# Patient Record
Sex: MALE | Race: WHITE | NOT HISPANIC OR LATINO | Employment: UNEMPLOYED
[De-identification: names, ages, dates, MRNs, and addresses within clinical notes are randomized per-mention and may not be internally consistent; named-entity substitution may affect disease eponyms.]

---

## 2023-03-15 ENCOUNTER — TRANSCRIBE ORDERS (OUTPATIENT)
Dept: OTHER | Age: 18
End: 2023-03-15

## 2023-03-15 ENCOUNTER — TELEPHONE (OUTPATIENT)
Dept: WOUND CARE | Facility: CLINIC | Age: 18
End: 2023-03-15
Payer: COMMERCIAL

## 2023-03-15 DIAGNOSIS — L89.154 PRESSURE INJURY OF COCCYGEAL REGION, STAGE 4 (H): Primary | ICD-10-CM

## 2023-03-15 NOTE — TELEPHONE ENCOUNTER
Relative of the patient called to follow up on a referral sent in for the patient who has a stage 4 pressure injury of the coccygeal region. Please call [facility] [phone]

## 2023-03-16 NOTE — TELEPHONE ENCOUNTER
Please schedule with Rama CARRILLO at [facility] for next available appointment.    **If scheduling with Rama CARRILLO please schedule a follow up 2-3 weeks after initial appointment.    Is the patient able to make their own medical decisions? Yes    Is patient a LILIBETH lift? PLEASE INQUIRE WHEN MAKING THE APPOINTMENT AND PUT IN APPOINTMENT NOTES    Routing to  Wound Healing Scheduling.

## 2023-04-27 ENCOUNTER — MEDICAL CORRESPONDENCE (OUTPATIENT)
Dept: HEALTH INFORMATION MANAGEMENT | Facility: CLINIC | Age: 18
End: 2023-04-27
Payer: COMMERCIAL

## 2023-04-28 ENCOUNTER — HOSPITAL ENCOUNTER (OUTPATIENT)
Dept: WOUND CARE | Facility: CLINIC | Age: 18
Discharge: HOME | End: 2023-04-28
Attending: PHYSICIAN ASSISTANT | Admitting: PHYSICIAN ASSISTANT
Payer: COMMERCIAL

## 2023-04-28 VITALS
RESPIRATION RATE: 20 BRPM | SYSTOLIC BLOOD PRESSURE: 129 MMHG | HEART RATE: 113 BPM | TEMPERATURE: 98.4 F | DIASTOLIC BLOOD PRESSURE: 60 MMHG

## 2023-04-28 DIAGNOSIS — L89.214 PRESSURE INJURY OF TROCHANTERIC REGION OF RIGHT HIP, STAGE 4 (H): ICD-10-CM

## 2023-04-28 DIAGNOSIS — L89.154 PRESSURE INJURY OF COCCYGEAL REGION, STAGE 4 (H): Primary | ICD-10-CM

## 2023-04-28 LAB
CRP SERPL-MCNC: 47.48 MG/L
ERYTHROCYTE [SEDIMENTATION RATE] IN BLOOD BY WESTERGREN METHOD: 31 MM/HR (ref 0–15)

## 2023-04-28 PROCEDURE — 85652 RBC SED RATE AUTOMATED: CPT | Performed by: PHYSICIAN ASSISTANT

## 2023-04-28 PROCEDURE — 17250 CHEM CAUT OF GRANLTJ TISSUE: CPT | Performed by: PHYSICIAN ASSISTANT

## 2023-04-28 PROCEDURE — 99205 OFFICE O/P NEW HI 60 MIN: CPT | Mod: 25 | Performed by: PHYSICIAN ASSISTANT

## 2023-04-28 PROCEDURE — 36415 COLL VENOUS BLD VENIPUNCTURE: CPT | Performed by: PHYSICIAN ASSISTANT

## 2023-04-28 PROCEDURE — 84134 ASSAY OF PREALBUMIN: CPT | Performed by: PHYSICIAN ASSISTANT

## 2023-04-28 PROCEDURE — G0463 HOSPITAL OUTPT CLINIC VISIT: HCPCS | Mod: 25

## 2023-04-28 PROCEDURE — 86140 C-REACTIVE PROTEIN: CPT | Performed by: PHYSICIAN ASSISTANT

## 2023-04-28 NOTE — PROGRESS NOTES
Patient Active Problem List   Diagnosis     Pressure injury of coccygeal region, stage 4 (H)     Pressure injury of trochanteric region of right hip, stage 4 (H)     No past medical history on file.  Labs: No results for input(s): ALBUMIN, HGB, INR, WBC, A1C, CRP in the last 34844 hours.    Invalid input(s): PREALBUMIN,  GLUCOSE, MICROBIO  Nutrition requirements were discussed with patient today.  Vitals:  /60 (BP Location: Right arm, Patient Position: Sitting, Cuff Size: Adult Large)   Pulse 113   Temp 98.4  F (36.9  C) (Temporal)   Resp 20   Wound:   Wound (used by OP [facility] only) 04/28/23 1106 coccyx pressure injury (Active)   Thickness/Stage Stage 4 04/28/23 1100   Base pink;slough 04/28/23 1100   Periwound macerated 04/28/23 1100   Periwound Temperature warm 04/28/23 1100   Periwound Skin Turgor soft 04/28/23 1100   Edges open 04/28/23 1100   Length (cm) 4.7 04/28/23 1100   Width (cm) 2.2 04/28/23 1100   Depth (cm) 2.3 04/28/23 1100   Wound (cm^2) 10.34 cm^2 04/28/23 1100   Wound Volume (cm^3) 23.78 cm^3 04/28/23 1100   Undermining [Depth (cm)/Location] 12-12o'/ 5.7cm 04/28/23 1100   Drainage Characteristics/Odor yellow;tan;creamy;malodorous 04/28/23 1100   Drainage Amount copious 04/28/23 1100   Care, Wound chemical cautery applied 04/28/23 1100       Wound (used by OP [facility] only) 04/28/23 1108 Right greater trochanter pressure injury (Active)   Thickness/Stage Stage 4 04/28/23 1100   Base hypergranulation;pink;moist 04/28/23 1100   Periwound intact;macerated 04/28/23 1100   Periwound Temperature warm 04/28/23 1100   Periwound Skin Turgor soft 04/28/23 1100   Edges open 04/28/23 1100   Length (cm) 5.2 04/28/23 1100   Width (cm) 5.7 04/28/23 1100   Depth (cm) 1 04/28/23 1100   Wound (cm^2) 29.64 cm^2 04/28/23 1100   Wound Volume (cm^3) 29.64 cm^3 04/28/23 1100   Drainage Characteristics/Odor yellow;tan;serous;malodorous 04/28/23 1100   Drainage Amount copious 04/28/23 1100   Care, Wound chemical cautery  applied 04/28/23 1100      Photo:    Further instructions from your care team       Jamaal HOLM Favio      2005  A DME order was not completed because the supplies are ordered by home care or at a care facility  [facility] [phone] Fax [phone]   Please order supplies    MRI of Pelvis and right Trochanter- order sent to [facility]  Blood work done today: Pre- Albumen; CRP, ESR  Silver Nitrate applied to both wounds and recommend to do every 2 weeks    A diet high in protein is important for wound healing, we recommend getting 90 grams of protein per day. Taking protein shakes or bars are a good way to get extra protein in your diet.   Good sources of protein:  Pork 26g per 3 oz  Whey protein powder - 24g per scoop (on average)  Greek yogurt - 23g per 8oz   Chicken or Turkey - 23g per 3oz  Fish - 20-25g per 3oz  Beef - 18-23g per 3oz  Navy beans - 20g per cup  Cottage cheese - 14g per 1/2 cup   Lentils - 13g per 1/4 cup  Beef jerky 13g per 1oz  2% milk - 8g per cup  Peanut butter - 8g per 2 tablespoons  Eggs - 6g per egg  Mixed nuts - 6g per 2oz    Repositioning: Order placed today for Group 2 with [employer]    Bed: Patient will need Group 2, low air loss mattress due to 2 large, stage 4 ulceration on the patient's pelvis that has failed to improve on a normal mattress despite regular wound cares and repositioning. A group 1 mattress will not be adequate to offload these severe ulcerations. Patient has impaired sensation and is unable to reposition independently.    Reposition MINIMALLY every 1-2 hours in bed to relieve pressure and promote perfusion to tissue.    Purple mattress is OK once wounds are healed    Chair:     [facility] will send New Wheel chair and cushion; new cushion is Pressure mapped    When up to the chair, do not sit for longer than one hour total before returning to bed for at least 60 minutes to relieve pressure and promote perfusion to the tissue.  Completely  "recline/tilt for 15 minutes each hour.  o Sit on a chair cushion when up to the chair.     Wound Dressing Change: Coccyx  - Wash your hands with soap and water before you begin your dressing change and prepare a clean surface for dressings.  After cleansing with mild unscented soap (such as Cetaphil, Cerave or Dove) and water, Apply small amount of VASHE or Vinegar solution on gauze, lay into wound bed, let sit for 5-10 minutes, remove gauze (do not rinse) then apply dressing:  Ribbon and tuck Antimicrobial 4\" roll gauze into the undermine area and fill dead space; do not over pack  Cover with Absorbant 6x6 Pad/ Kwik/ Kerra Max/ Xudry  Secure with 2\" roll Medipore  Change 1-2 times a day and as needed for excessive soilage    Wound Dressing Change: Right Trochanter  - Wash your hands with soap and water before you begin your dressing change and prepare a clean surface for dressings.  After cleansing with mild unscented soap (such as Cetaphil, Cerave or Dove) and water, Apply small amount of VASHE or Vinegar solution on gauze, lay into wound bed, let sit for 5-10 minutes, remove gauze (do not rinse) then apply dressing:  Ribbon and tuck Antimicrobial 4\" roll gauze into the undermine area and fill dead space; do not over pack  Cover with Absorbant 6x6 Pad/ Kwik/ Kerra Max/ Xudry  Secure with 2\" roll Medipore  Change 1-2 times a day and as needed for excessive soilage    To have flap surgery with Dr. Fernandez you must have  1. Group 2 Mattress - Maintain bedrest 4 weeks post op    2. [employer] Medical: expecting new wheelchair with proper mapping  3. Proper nutrition with high Protein diet  4. Bowel and bladder control: self cath and bowel program  5. Non smoker- NO NICOTINE     Brenda Paulino PA-C April 28, 2023         "

## 2023-04-28 NOTE — DISCHARGE INSTRUCTIONS
Jamaal Stahl      2005    A DME order was not completed because the supplies are ordered by home care or at a care facility    [facility] [phone] Fax [phone]   Please order supplies    MRI of Pelvis and right Trochanter- order sent to [facility]  Blood work done today: Pre- Albumen; CRP, ESR  Silver Nitrate applied to both wounds and recommend to do every 2 weeks    A diet high in protein is important for wound healing, we recommend getting 90 grams of protein per day. Taking protein shakes or bars are a good way to get extra protein in your diet.   Good sources of protein:  Pork 26g per 3 oz  Whey protein powder - 24g per scoop (on average)  Greek yogurt - 23g per 8oz   Chicken or Turkey - 23g per 3oz  Fish - 20-25g per 3oz  Beef - 18-23g per 3oz  Navy beans - 20g per cup  Cottage cheese - 14g per 1/2 cup   Lentils - 13g per 1/4 cup  Beef jerky 13g per 1oz  2% milk - 8g per cup  Peanut butter - 8g per 2 tablespoons  Eggs - 6g per egg  Mixed nuts - 6g per 2oz    Repositioning: Order placed today for Group 2 with [employer]  Bed: Patient will need Group 2, low air loss mattress due to 2 large, stage 4 ulceration on the patient's pelvis that has failed to improve on a normal mattress despite regular wound cares and repositioning. A group 1 mattress will not be adequate to offload these severe ulcerations. Patient has impaired sensation and is unable to reposition independently.  Reposition MINIMALLY every 1-2 hours in bed to relieve pressure and promote perfusion to tissue.  Purple mattress is OK once wounds are healed  Chair:   [facility] will send New Wheel chair and cushion; new cushion is Pressure mapped  When up to the chair, do not sit for longer than one hour total before returning to bed for at least 60 minutes to relieve pressure and promote perfusion to the tissue.  Completely recline/tilt for 15 minutes each hour.  Sit on a chair cushion when up to the chair.     Wound  "Dressing Change: Coccyx  - Wash your hands with soap and water before you begin your dressing change and prepare a clean surface for dressings.  After cleansing with mild unscented soap (such as Cetaphil, Cerave or Dove) and water, Apply small amount of VASHE or Vinegar solution on gauze, lay into wound bed, let sit for 5-10 minutes, remove gauze (do not rinse) then apply dressing:  Ribbon and tuck Antimicrobial 4\" roll gauze into the undermine area and fill dead space; do not over pack  Cover with Absorbant 6x6 Pad/ Kwik/ Kerra Max/ Xudry  Secure with 2\" roll Medipore  Change 1-2 times a day and as needed for excessive soilage    Wound Dressing Change: Right Trochanter  - Wash your hands with soap and water before you begin your dressing change and prepare a clean surface for dressings.  After cleansing with mild unscented soap (such as Cetaphil, Cerave or Dove) and water, Apply small amount of VASHE or Vinegar solution on gauze, lay into wound bed, let sit for 5-10 minutes, remove gauze (do not rinse) then apply dressing:  Ribbon and tuck Antimicrobial 4\" roll gauze into the undermine area and fill dead space; do not over pack  Cover with Absorbant 6x6 bordered dressing Kerramax/Sorbion/Zetuvit  Change 1-2 times a day and as needed for excessive soilage    To have flap surgery with Dr. Fernandez you must have  1. Group 2 Mattress - Maintain bedrest 4 weeks post op    2. [employer]i Medical: expecting new wheelchair with proper mapping  3. Proper nutrition with high Protein diet  4. Bowel and bladder control: self cath and bowel program  5. Non smoker- NO NICOTINE    How to prepare the vinegar solution:    1. Mix 2 tablespoons of white household vinegar with 2 cups of water  2. Store in the refrigerator and use for up to 5 days     Brenda Paulino PA-C April 28, 2023    Call us at [phone] if you have any questions about your wounds, have redness or swelling around your wound, have a fever of 101 or greater or if " you have any other problems or concerns. We answer the phone Monday through Friday 8 am to 4 pm, please leave a message as we check the voicemail frequently throughout the day. If you had a positive experience please indicate that on your patient satisfaction survey form that [facility] will be sending you.It was a pleasure meeting with you today.  Thank you for allowing me and my team the privilege of caring for you today.  YOU are the reason we are here, and I truly hope we provided you with the excellent service you deserve.  Please let us know if there is anything else we can do for you so that we can be sure you are leaving completely satisfied with your care experience.    If you have any billing related questions please call the [facility] Business office at [phone]. The clinic staff does not handle billing related matters. If you are scheduled to have a follow up appointment, you will receive a reminder call the day before your visit. On the appointment day please arrive 15 minutes prior to your appointment time. If you are unable to keep that appointment, please call the clinic to cancel or reschedule. If you are more than 10 minutes late or greater for your appointment, the clinic policy is that you may be asked to reschedule.
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
## 2023-05-01 LAB — PREALB SERPL IA-MCNC: 13 MG/DL (ref 15–45)

## 2023-05-12 ENCOUNTER — TELEPHONE (OUTPATIENT)
Dept: WOUND CARE | Facility: CLINIC | Age: 18
End: 2023-05-12
Payer: COMMERCIAL

## 2023-05-12 NOTE — TELEPHONE ENCOUNTER
MRI received from [facility] - negative for osteomyelitis in sacrum or R hip. Ok to give this information to patient if he calls for results, otherwise will pass along to Dr. Fernandez to discuss at his next visit.

## 2023-05-15 ENCOUNTER — MEDICAL CORRESPONDENCE (OUTPATIENT)
Dept: HEALTH INFORMATION MANAGEMENT | Facility: CLINIC | Age: 18
End: 2023-05-15
Payer: COMMERCIAL

## 2023-05-15 ENCOUNTER — TELEPHONE (OUTPATIENT)
Dept: WOUND CARE | Facility: CLINIC | Age: 18
End: 2023-05-15
Payer: COMMERCIAL

## 2023-05-15 NOTE — TELEPHONE ENCOUNTER
Magi from [facility] called, patients needs OK to use a wrap with a foam edge to protect the dressing when he transfers.  [phone]

## 2023-05-15 NOTE — TELEPHONE ENCOUNTER
Returned call to Magi and gave ok for bordered foam dressings. No further questions or concerns.

## 2023-05-15 NOTE — TELEPHONE ENCOUNTER
Returned call to Magi. Discussed with her that the face to face will be faxed to [employer]. Magi states [employer] is stating the orders were never received. Will fax the order, face to face, and facesheet again to [employer]. Magi also given the vinegar solution ratio:    How to prepare the vinegar solution:    1. Mix 2 tablespoons of white household vinegar with 2 cups of water  2. Store in the refrigerator and use for up to 5 days    No further questions or concerns.

## 2023-05-15 NOTE — TELEPHONE ENCOUNTER
[facility] to follow up on order for mattress from [facility].  They are asking for a face to face.  Did [facility] send this?  Is this something his PCP should be providing?  Also, insurance is not covering Vashe.  What is the vinegar/water ratio for that solution? [phone]

## 2023-05-17 NOTE — ADDENDUM NOTE
Encounter addended by: Eneida Edwards RN on: 5/17/2023 8:26 AM   Actions taken: Edited Discharge Instructions

## 2023-05-31 ENCOUNTER — MEDICAL CORRESPONDENCE (OUTPATIENT)
Dept: HEALTH INFORMATION MANAGEMENT | Facility: CLINIC | Age: 18
End: 2023-05-31
Payer: COMMERCIAL

## 2023-06-13 ENCOUNTER — MEDICAL CORRESPONDENCE (OUTPATIENT)
Dept: HEALTH INFORMATION MANAGEMENT | Facility: CLINIC | Age: 18
End: 2023-06-13
Payer: COMMERCIAL

## 2023-07-05 ENCOUNTER — HOSPITAL ENCOUNTER (OUTPATIENT)
Dept: WOUND CARE | Facility: CLINIC | Age: 18
Discharge: HOME | End: 2023-07-05
Attending: PHYSICIAN ASSISTANT | Admitting: PHYSICIAN ASSISTANT
Payer: COMMERCIAL

## 2023-07-05 VITALS — SYSTOLIC BLOOD PRESSURE: 108 MMHG | TEMPERATURE: 97.9 F | HEART RATE: 69 BPM | DIASTOLIC BLOOD PRESSURE: 66 MMHG

## 2023-07-05 DIAGNOSIS — L89.154 PRESSURE INJURY OF COCCYGEAL REGION, STAGE 4 (H): ICD-10-CM

## 2023-07-05 DIAGNOSIS — L89.214 PRESSURE INJURY OF TROCHANTERIC REGION OF RIGHT HIP, STAGE 4 (H): Primary | ICD-10-CM

## 2023-07-05 PROCEDURE — 17250 CHEM CAUT OF GRANLTJ TISSUE: CPT | Performed by: PHYSICIAN ASSISTANT

## 2023-07-05 PROCEDURE — 99214 OFFICE O/P EST MOD 30 MIN: CPT | Mod: 25 | Performed by: PHYSICIAN ASSISTANT

## 2023-07-05 RX ORDER — BACLOFEN 10 MG/1
10 TABLET ORAL
COMMUNITY
Start: 2022-10-07

## 2023-07-05 RX ORDER — OXYBUTYNIN CHLORIDE 5 MG/1
15 TABLET ORAL
COMMUNITY
Start: 2022-04-10 | End: 2023-09-20

## 2023-07-05 NOTE — DISCHARGE INSTRUCTIONS
"Jamaal A Ripplemead      2005    A DME order was not completed because the supplies are ordered by home care or at a care facility      [facility] [phone] Fax [phone] Please order supplies (they come every day)    Silver Nitrate applied to both wounds and recommend to do every week  We recommend to have have a wound vac on your coccyx as there was no infection shown on your MRI. Have your Local wound clinic order or put in their input for next dressings based on appearance and drainage. Follow up with us in 3 months as not recommending surgery at this time.     Received Group 2 mattress.     Wound Dressing Change: Coccyx  - Wash your hands with soap and water before you begin your dressing change and  prepare a clean surface for dressings.  After cleansing with mild unscented soap (such as Cetaphil, Cerave or Dove) and  water, Apply small amount of VASHE or Vinegar solution on gauze, lay into wound  bed, let sit for 5-10 minutes, remove gauze (do not rinse) then apply dressing:  Ribbon and tuck Antimicrobial 4\" roll gauze into the undermine area and fill dead  space; do not over pack  Cover with Absorbant 6x6 Pad/ Kwik/ Kerra Max/ Xudry  Secure with 2\" roll Medipore  Change 1-2 times a day and as needed for excessive soilage    Wound Dressing Change: Right Trochanter  - Wash your hands with soap and water before you begin your dressing change and  prepare a clean surface for dressings.  After cleansing with mild unscented soap (such as Cetaphil, Cerave or Dove) and  water, Apply small amount of VASHE or Vinegar solution on gauze, lay into wound  bed, let sit for 5-10 minutes, remove gauze (do not rinse) then apply dressing:  Ribbon and tuck Antimicrobial 4\" roll gauze into the undermine area and fill dead  space; do not over pack  Cover with Absorbant 6x6 bordered dressing Kerramax/Sorbion/Zetuvit  Change 1-2 times a day and as needed for excessive soilage    How to prepare the vinegar " solution:  1. Mix 2 tablespoons of white household vinegar with 2 cups of water  2. Store in the refrigerator and use for up to 5 days    A diet high in protein is important for wound healing, we recommend getting 90  grams of protein per day. Taking protein shakes or bars are a good way to get extra  protein in your diet.    Good sources of protein:  Pork 26g per 3 oz  Whey protein powder - 24g per scoop (on average)  Greek yogurt - 23g per 8oz  Chicken or Turkey - 23g per 3oz  Fish - 20-25g per 3oz  Beef - 18-23g per 3oz  Navy beans - 20g per cup  Cottage cheese - 14g per 1/2 cup  Lentils - 13g per 1/4 cup  Beef jerky 13g per 1oz  2% milk - 8g per cup  Peanut butter - 8g per 2 tablespoons  Eggs - 6g per egg  Mixed nuts - 6g per 2oz    Repositioning: Order placed today for Group 2 with Reliable (received)  Bed: Patient will need Group 2, low air loss mattress due to 2 large, stage 4  ulceration on the patient's pelvis that has failed to improve on a normal mattress  despite regular wound cares and repositioning. A group 1 mattress will not be  adequate to offload these severe ulcerations. Patient has impaired sensation and is  unable to reposition independently.  Reposition MINIMALLY every 1-2 hours in bed to relieve pressure and promote  perfusion to tissue.  Purple mattress is OK once wounds are healed  Chair:  [facility] will send New Wheel chair and cushion; new cushion is Pressure  mapped  When up to the chair, do not sit for longer than one hour total before returning to  bed for at least 60 minutes to relieve pressure and promote perfusion to the tissue.  Completely recline/tilt for 15 minutes each hour.  Sit on a chair cushion when up to the chair.     Brenda Paulino PA-C July 5, 2023    Call us at [phone] if you have any questions about your wounds, have redness or swelling around your wound, have a fever of 101 degrees Fahrenheit or greater or if you have any other problems or concerns. We  answer the phone Monday through Friday 8 am to 4 pm, please leave a message as we check the voicemail frequently throughout the day.     If you had a positive experience please indicate that on your patient satisfaction survey form that [facility] will be sending you.    It was a pleasure meeting with you today.  Thank you for allowing me and my team the privilege of caring for you today.  YOU are the reason we are here, and I truly hope we provided you with the excellent service you deserve.  Please let us know if there is anything else we can do for you so that we can be sure you are leaving completely satisfied with your care experience.      If you have any billing related questions please call the [facility] Business office at [phone]. The clinic staff does not handle billing related matters.    If you are scheduled to have a follow up appointment, you will receive a reminder call the day before your visit. On the appointment day please arrive 15 minutes prior to your appointment time. If you are unable to keep that appointment, please call the clinic to cancel or reschedule. If you are more than 10 minutes late or greater for your scheduled appointment time, the clinic policy is that you may be asked to reschedule.
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
## 2024-01-23 ENCOUNTER — HOSPITAL ENCOUNTER (OUTPATIENT)
Dept: WOUND CARE | Facility: CLINIC | Age: 19
Discharge: HOME | End: 2024-01-23
Attending: PHYSICIAN ASSISTANT | Admitting: PHYSICIAN ASSISTANT
Payer: COMMERCIAL

## 2024-01-23 VITALS — HEART RATE: 104 BPM | TEMPERATURE: 96.9 F | DIASTOLIC BLOOD PRESSURE: 86 MMHG | SYSTOLIC BLOOD PRESSURE: 134 MMHG

## 2024-01-23 DIAGNOSIS — L89.154 PRESSURE INJURY OF COCCYGEAL REGION, STAGE 4 (H): ICD-10-CM

## 2024-01-23 DIAGNOSIS — L89.214 PRESSURE INJURY OF TROCHANTERIC REGION OF RIGHT HIP, STAGE 4 (H): Primary | ICD-10-CM

## 2024-01-23 LAB
CRP SERPL-MCNC: 108.13 MG/L
ERYTHROCYTE [SEDIMENTATION RATE] IN BLOOD BY WESTERGREN METHOD: 55 MM/HR (ref 0–15)
PREALB SERPL-MCNC: 7.4 MG/DL (ref 20–40)

## 2024-01-23 PROCEDURE — 17250 CHEM CAUT OF GRANLTJ TISSUE: CPT | Performed by: PHYSICIAN ASSISTANT

## 2024-01-23 PROCEDURE — 84134 ASSAY OF PREALBUMIN: CPT | Performed by: PHYSICIAN ASSISTANT

## 2024-01-23 PROCEDURE — 85652 RBC SED RATE AUTOMATED: CPT | Performed by: PHYSICIAN ASSISTANT

## 2024-01-23 PROCEDURE — 86140 C-REACTIVE PROTEIN: CPT | Performed by: PHYSICIAN ASSISTANT

## 2024-01-23 PROCEDURE — 99215 OFFICE O/P EST HI 40 MIN: CPT | Mod: 25 | Performed by: PHYSICIAN ASSISTANT

## 2024-01-23 PROCEDURE — 36415 COLL VENOUS BLD VENIPUNCTURE: CPT | Performed by: PHYSICIAN ASSISTANT

## 2024-01-23 NOTE — DISCHARGE INSTRUCTIONS
Jamaal Stahl      2005    A DME order for supplies has been placed to [facility]. If there are any issues with your order including not receiving the order please call [facility] at [phone]. They can also provide a tracking number for you.      Dressing changes outside of clinic are being performed by [facility] Care [facility] Family    [facility] [phone] Fax [phone] coming daily     PLAN: 01/23/24  -check labs today (CPR, sed rate, pre albumin)  -increase protein intake  -Right hip daily dressing as below; coccyx twice daily as below  -[facility] for supplies  -Rama Paulino PA-C will discuss with Dr Fernandez; you will see Dr Fernandez next in a month and another for one month beyond that; surgery timeline and readiness will be discussed in upcoming visits    Preparing for Flap Surgery  To be eligible for flap surgery:  DENIED No nicotine use 2 months prior to surgery.  DONE Have Group 2 mattress in their home PRIOR to scheduling surgery.  Ideally this is initiated at first contact with patient if not already in home.  Must have bed in their permanent residence even if they are planning on rehabbing in a facility.  DONE Have a seating system that has been pressure mapped and assessed within the last year. If determined to need a new chair this needs to be available PRIOR to scheduling surgery.   Check today 01/23/24 Prealbumin >15 mg/dL. Patient should be counseled ASAP on high protein diet, ideally should utilize a protein supplement.  Ongoing Demonstrate compliance with offloading. Wounds should demonstrate stability or improvement while preparing for surgery.   Str cath and daily bowel program Have an established, effective, bowel and bladder program.  If patient does not have a colostomy they must have a bowel program that can be performed in bed, discussed , in a lateral position, with no concern that incision/wound will become contaminated. If patient does not already have a catheter they can  "expect a amos catheter at time of surgery that will be left in for 3-4 weeks.   N/A If diabetic, this must be well-controlled.   Work-up prior to consult with Dr. Fernandez:  Above requirements should be met or in progress.  Unlikely to need addl MRI Pelvic MRI w & w/o contrast performed within the [facility] system within the last year.  If unable to undergo MRI can defer next imaging choice to Dr. Fernandez.  Check today 01/24/24 Recent CRP, ESR, and prealbumin values  Expectations for rehab post-flap surgery:  100% bed rest with HOB <30 degrees for at least 4 weeks in a setting that has 24 hour care (TCU, SNF etc). If any issues with wound dehiscence this may be prolonged. It has been increasingly more difficult to find placement in facilities for patients post-flap, if patient has sufficient services in their current living situation rehab at home may be considered. Flap surgery does not guarantee facility placement.   Attend a 4 week post-op appointment at the [facility]. Patient must arrive via stretcher transport to be arranged by patient or facility.  This may cost $250-$800 out of pocket.   Patient may not sit up (in bed or otherwise) until cleared at the post-op appointment. At that point patient will be given a detailed seating protocol which will instruct patient how to gradually begin sitting up in bed. Next, patient's seating system will need to be pressure mapped before starting the wheelchair portion of the sitting protocol.     Continue to be nicotine free at least 6 weeks after surgery.        Wound Dressing Change: right trochanter  - Wash your hands with soap and water before you begin your dressing change and prepare a clean surface for dressings.  - Cleanse with mild unscented soap (such as Cetaphil, Cerave or Dove) and water  - Apply small amount of VASHE on gauze, lay into wound bed, let sit for 10 minutes, remove gauze (do not rinse)   - apply 1/6 of 4x5\" Hydrofera " "Blue Ready Transfer foam as wound filler, cut to fit size of wound  - cover with Zetuvit Plus silicone border super absorbant 5x5\"  Change daily and if needed for excess saturation    Wound Dressing Change: coccyx  - Wash your hands with soap and water before you begin your dressing change and prepare a clean surface for dressings.  - Cleanse with mild unscented soap (such as Cetaphil, Cerave or Dove) and water  - Apply small amount of VASHE on gauze, lay into wound bed, let sit for 10 minutes, remove gauze (do not rinse)  -  gently fill wound with dry AMD roll gauze, using approx 1/4th roll  - cover with 5x9\" ABD and tape  Change twice daily and as needed for excess saturation         Main Provider: Brenda Paulino PA-C January 23, 2024    Call us at [phone] if you have any questions about your wounds, have redness or swelling around your wound, have a fever of 101 degrees Fahrenheit or greater or if you have any other problems or concerns. We answer the phone Monday through Friday 8 am to 4 pm, please leave a message as we check the voicemail frequently throughout the day.     If you had a positive experience please indicate that on your patient satisfaction survey form that [facility] will be sending you.    It was a pleasure meeting with you today.  Thank you for allowing me and my team the privilege of caring for you today.  YOU are the reason we are here, and I truly hope we provided you with the excellent service you deserve.  Please let us know if there is anything else we can do for you so that we can be sure you are leaving completely satisfied with your care experience.      If you have any billing related questions please call the [facility] Business office at [phone]. The clinic staff does not handle billing related matters.    If you are scheduled to have a follow up appointment, you will receive a reminder call the day before your visit. On the appointment day please arrive 15 " minutes prior to your appointment time. If you are unable to keep that appointment, please call the clinic to cancel or reschedule. If you are more than 10 minutes late or greater for your scheduled appointment time, the clinic policy is that you may be asked to reschedule.
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
## 2024-01-24 ENCOUNTER — MEDICAL CORRESPONDENCE (OUTPATIENT)
Dept: HEALTH INFORMATION MANAGEMENT | Facility: CLINIC | Age: 19
End: 2024-01-24
Payer: COMMERCIAL

## 2024-01-24 ENCOUNTER — TELEPHONE (OUTPATIENT)
Dept: WOUND CARE | Facility: CLINIC | Age: 19
End: 2024-01-24
Payer: COMMERCIAL

## 2024-01-24 DIAGNOSIS — R79.89 LOW SERUM PREALBUMIN: ICD-10-CM

## 2024-01-24 DIAGNOSIS — L89.154 PRESSURE INJURY OF COCCYGEAL REGION, STAGE 4 (H): Primary | ICD-10-CM

## 2024-01-24 DIAGNOSIS — L89.214 PRESSURE INJURY OF TROCHANTERIC REGION OF RIGHT HIP, STAGE 4 (H): ICD-10-CM

## 2024-01-24 DIAGNOSIS — E46 MALNUTRITION (H): ICD-10-CM

## 2024-01-24 NOTE — TELEPHONE ENCOUNTER
Please call patient:  -we checked a marker of nutrition, prealbumin, and it came back very low  -his level was 7.4 we like to see it be at least 15 before doing any surgery  -this shows that his body is in need of more protein, he should aim to get at least 90g per day  -recommend that he consume at least 1 if not 2 high protein supplements daily to get to 90g per day

## 2024-01-25 NOTE — TELEPHONE ENCOUNTER
Left message for patient on both phone #s explaining these recommendations, and to call back if any further questions.

## 2024-01-26 ENCOUNTER — TELEPHONE (OUTPATIENT)
Dept: WOUND CARE | Facility: CLINIC | Age: 19
End: 2024-01-26
Payer: COMMERCIAL

## 2024-01-26 NOTE — TELEPHONE ENCOUNTER
Magi from [facility] called, had questions on labwork, care plan and getting supplies. [phone]

## 2024-01-26 NOTE — TELEPHONE ENCOUNTER
Returned call to Magi. Discussed that lab work was drawn in the clinic at his appointment. Care plan reviewed. Home care is not able to change the dressing twice a day. Discussed with Magi that [facility] is not expecting home care to change twice daily but likely had the discussion with the patient that himself or family change the 2nd time. Magi also stated that the patient is not able to get supplies at this time due to insurance but after 2/1 he will be able to get supplies.

## 2024-01-26 NOTE — ADDENDUM NOTE
Encounter addended by: Eneida Edwards RN on: 1/26/2024 10:13 AM   Actions taken: Edited Discharge Instructions

## 2024-02-01 RX ORDER — AMINO ACIDS/PROTEIN HYDROLYS 15G-100/30
1 LIQUID (ML) ORAL 2 TIMES DAILY WITH MEALS
Qty: 887 ML | Refills: 3 | Status: SHIPPED | OUTPATIENT
Start: 2024-02-01

## 2024-02-01 RX ORDER — ARGININE/ASCORBATE SOD/VITE AC 4.5 G/9.2G
1 POWDER IN PACKET (EA) ORAL 2 TIMES DAILY
Qty: 56 PACKET | Refills: 1 | Status: SHIPPED | OUTPATIENT
Start: 2024-02-01

## 2024-02-01 RX ORDER — ARGININE/GLUTAMINE/CALCIUM BMB 7G-7G-1.5G
1 POWDER IN PACKET (EA) ORAL 2 TIMES DAILY
Qty: 60 PACKET | Refills: 1 | Status: SHIPPED | OUTPATIENT
Start: 2024-02-01

## 2024-02-01 NOTE — TELEPHONE ENCOUNTER
Magi called from [facility] regarding the prealbumin for the patient. She reports there was a care conference today where the malnutrition was discussed. The patient is not able to afford supplements or even extra food at this time. Magi was looking for help in getting supplements if possible. Supplements are typically not covered by insurance but could attempt to get these covered. Magi requests the scripts for supplements be sent to her and she will work on seeing if these can be covered. Scripts for Sathya, Arginaid, and Prostat sent to see if any of these could work. Frida contacted also to see if they have any community outreach possible to assist patient.

## 2024-02-22 ENCOUNTER — TELEPHONE (OUTPATIENT)
Dept: WOUND CARE | Facility: CLINIC | Age: 19
End: 2024-02-22
Payer: COMMERCIAL

## 2024-02-22 ENCOUNTER — HOSPITAL ENCOUNTER (OUTPATIENT)
Dept: WOUND CARE | Facility: CLINIC | Age: 19
Discharge: HOME | End: 2024-02-22
Attending: SURGERY | Admitting: SURGERY
Payer: COMMERCIAL

## 2024-02-22 VITALS — SYSTOLIC BLOOD PRESSURE: 148 MMHG | HEART RATE: 101 BPM | TEMPERATURE: 96.7 F | DIASTOLIC BLOOD PRESSURE: 75 MMHG

## 2024-02-22 DIAGNOSIS — L89.313 PRESSURE INJURY OF RIGHT PERINEAL ISCHIAL REGION, STAGE 3 (H): ICD-10-CM

## 2024-02-22 DIAGNOSIS — L89.154 PRESSURE INJURY OF COCCYGEAL REGION, STAGE 4 (H): ICD-10-CM

## 2024-02-22 DIAGNOSIS — L89.214 PRESSURE INJURY OF TROCHANTERIC REGION OF RIGHT HIP, STAGE 4 (H): Primary | ICD-10-CM

## 2024-02-22 LAB — PREALB SERPL-MCNC: 12.3 MG/DL (ref 20–40)

## 2024-02-22 PROCEDURE — 84134 ASSAY OF PREALBUMIN: CPT | Performed by: SURGERY

## 2024-02-22 PROCEDURE — 99203 OFFICE O/P NEW LOW 30 MIN: CPT | Performed by: SURGERY

## 2024-02-22 PROCEDURE — 36415 COLL VENOUS BLD VENIPUNCTURE: CPT | Performed by: SURGERY

## 2024-02-22 PROCEDURE — 97602 WOUND(S) CARE NON-SELECTIVE: CPT

## 2024-02-22 NOTE — DISCHARGE INSTRUCTIONS
Jamaal HOLM Favio      2005  A DME order for supplies ordered by HOME care  [facility] [phone] Fax [phone]  daily      PLAN: 2/22/24  Sathya supplements provided to increase protein intake  Pre-Albumen drawn   Patient planning to move to [place] 3/5/24  Discussion with Dr Fernandez regarding FLAP surgery: bilateral buttocks with bone biopsy  Patient will need a Temporary Colostomy BEFORE any surgery- will need to heal for at least 30 days then will schedule for FLAP surgery  Patient will need to call [facility] MD to discuss referral to [facility] for Temporary Colostomy      Preparing for Flap Surgery: to be eligible for flap surgery:  Patient denies use: No nicotine use 2 months prior to surgery.  DONE Have Group 2 mattress in their home PRIOR to scheduling surgery.  Ideally this is initiated at first contact with patient if not already in home.  Must have bed in their permanent residence even if they are planning on rehabbing in a facility.  DONE Have a seating system that has been pressure mapped and assessed within the last year. If determined to need a new chair this needs to be available PRIOR to scheduling surgery.   Check today Prealbumin. Patient should be counseled ASAP on high protein diet, ideally should utilize a protein supplement.  Ongoing Demonstrate compliance with offloading. Wounds should demonstrate stability or improvement while preparing for surgery.   Str cath and Needs Ostomy   Expectations for rehab post-flap surgery:  100% bed rest with HOB <30 degrees for at least 4 weeks in a setting that has 24 hour care (TCU, SNF etc). If any issues with wound dehiscence this may be prolonged. It has been increasingly more difficult to find placement in facilities for patients post-flap, if patient has sufficient services in their current living situation rehab at home may be considered. Flap surgery does not guarantee facility placement.   Attend a 4 week  "post-op appointment at the [facility]. Patient must arrive via stretcher transport to be arranged by patient or facility.  This may cost $250-$800 out of pocket.   Patient may not sit up (in bed or otherwise) until cleared at the post-op appointment. At that point patient will be given a detailed seating protocol which will instruct patient how to gradually begin sitting up in bed. Next, patient's seating system will need to be pressure mapped before starting the wheelchair portion of the sitting protocol.     Continue to be nicotine free at least 6 weeks after surgery.        Wound Dressing Change: Right trochanter  - Wash your hands and prepare surface with soap and water  - Cleanse with mild unscented soap (such as Cetaphil, Cerave or Dove) and water  - Apply small amount of VASHE on gauze, lay into wound bed, let sit for 10 minutes, remove gauze   Pat wound dry then apply Hydrofera Blue dressing   - Cut and apply 1/6 of 4x5\" Hydrofera Blue Ready Transfer foam as wound filler, to fit size of wound  - cover with 5x5 Zetuvit Plus silicone border    Change daily and if needed for excess saturation     Wound Dressing Change: Coccyx  - Wash your hands and prepare surface with soap and water  - Cleanse with mild unscented soap (such as Cetaphil, Cerave or Dove) and water  - Apply small amount of VASHE on gauze, lay into wound bed, let sit for 10 minutes, remove gauze (do not rinse)  - fluff and tuck 1/4 roll of dry 4\" AMD roll gauze into undermine and wound defect  - cover with half 5x9\" ABD and secure with Medipore 2\" tape  Change twice daily and as needed for excess saturation    Wound Dressing Change: Right Ischial Tuberosity  - Cleanse with mild unscented soap (such as Cetaphil, Cerave or Dove) and water  - Apply small amount of VASHE on gauze, lay into wound bed, let sit for 10 minutes, remove gauze (do not rinse)  Cut and apply 1/6 piece of 4x4 Polymem Pink   Cover with 5x5 Zetuvit Plus " Silicone border   Change Daily    Repositioning:  Bed: Reposition MINIMALLY every 1-2 hours in bed to relieve pressure and promote perfusion to tissue.  Patient is using a Group 2 mattress due to large, stage 4 pressure ulceration on the patient's pelvis that has failed to improve on a normal mattress despite regular wound cares and repositioning. A group 1 mattress is not adequate to offload these severe ulcerations. Patient has impaired sensation and is unable to reposition independently.   Chair: When up to the chair, do not sit for longer than one hour total before returning to bed for at least 60 minutes to relieve pressure and promote perfusion to the tissue.  Completely recline/tilt for 15 minutes each hour.  Sit on a chair cushion when up to the chair.     A diet high in protein is important for wound healing, we recommend getting 90 grams of protein per day. Taking protein shakes or bars are a good way to get extra protein in your diet.     Good sources of protein:  Pork 26g per 3 oz  Whey protein powder - 24g per scoop (on average)  Greek yogurt - 23g per 8oz   Chicken or Turkey - 23g per 3oz  Fish - 20-25g per 3oz  Beef - 18-23g per 3oz  Navy beans - 20g per cup  Cottage cheese - 14g per 1/2 cup   Lentils - 13g per 1/4 cup  Beef jerky 13g per 1oz  2% milk - 8g per cup  Peanut butter - 8g per 2 tablespoons  Eggs - 6g per egg  Mixed nuts - 6g per 2oz     Main Provider: Lauri Frenandez M.D. February 22, 2024    Call us at [phone] if you have any questions about your wounds, have redness or swelling around your wound, have a fever of 101 degrees Fahrenheit or greater or if you have any other problems or concerns. We answer the phone Monday through Friday 8 am to 4 pm, please leave a message as we check the voicemail frequently throughout the day.     If you had a positive experience please indicate that on your patient satisfaction survey form that [facility] will be sending you.  It was a  pleasure meeting with you today.  Thank you for allowing me and my team the privilege of caring for you today.  YOU are the reason we are here, and I truly hope we provided you with the excellent service you deserve.  Please let us know if there is anything else we can do for you so that we can be sure you are leaving completely satisfied with your care experience.      If you have any billing related questions please call the [facility] Business office at [phone]. The clinic staff does not handle billing related matters.  If you are scheduled to have a follow up appointment, you will receive a reminder call the day before your visit. On the appointment day please arrive 15 minutes prior to your appointment time. If you are unable to keep that appointment, please call the clinic to cancel or reschedule. If you are more than 10 minutes late or greater for your scheduled appointment time, the clinic policy is that you may be asked to reschedule.

## 2024-02-22 NOTE — PROGRESS NOTES
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  Labs: No results for input(s): \"ALBUMIN\", \"GLUCOSE\", \"HGB\", \"INR\", \"WBC\", \"A1C\", \"CRP\" in the last 54321 hours.    Invalid input(s): \"PREALBUMIN\", \"MICROBIO\"  Nutrition requirements were discussed with patient today.  Vitals:  BP (!) 148/75 (BP Location: Left arm, Patient Position: Sitting)   Pulse 101   Temp (!) 96.7  F (35.9  C) (Temporal)   Wound:   Wound (used by OP [facility] only) 04/28/23 1106 coccyx pressure injury (Active)   Thickness/Stage Stage 4 02/22/24 0800   Base granulating;slough 02/22/24 0800   Periwound macerated 02/22/24 0800   Periwound Temperature warm 02/22/24 0800   Periwound Skin Turgor soft 02/22/24 0800   Edges open 02/22/24 0800   Length (cm) 5 02/22/24 0800   Width (cm) 1.7 02/22/24 0800   Depth (cm) 1.9 02/22/24 0800   Wound (cm^2) 8.5 cm^2 02/22/24 0800   Wound Volume (cm^3) 16.15 cm^3 02/22/24 0800   Wound healing % 17.79 02/22/24 0800   Tunneling [Depth (cm)/Location] 12 oclock/ 7.2 cm and 11 oclock/ 8 cm 01/23/24 0855   Undermining [Depth (cm)/Location] 9-5 o' clock / 7.0 cm 02/22/24 0800   Drainage Characteristics/Odor serosanguineous 02/22/24 0800   Drainage Amount copious 02/22/24 0800   Care, Wound non-select wound debridement performed. 02/22/24 0800       Wound (used by OP [facility]I only) 04/28/23 1108 Right greater trochanter pressure injury (Active)   Thickness/Stage Stage 4 02/22/24 0800   Base granulating;slough 02/22/24 0800   Periwound intact 02/22/24 0800   Periwound Temperature warm 02/22/24 0800   Periwound Skin Turgor soft 02/22/24 0800   Edges open 02/22/24 0800   Length (cm) 1.9 02/22/24 0800   Width (cm) 3 02/22/24 0800   Depth (cm) 0.2 02/22/24 0800   Wound (cm^2) 5.7 cm^2 02/22/24 0800   Wound Volume (cm^3) 1.14 " cm^3 02/22/24 0800   Wound healing % 80.77 02/22/24 0800   Drainage Characteristics/Odor tan;serous 02/22/24 0800   Drainage Amount large 02/22/24 0800   Care, Wound non-select wound debridement performed. 02/22/24 0800       Wound (used by OP [facility] only) 02/22/24 0811 Right ischial tuberosity pressure injury (Active)   Thickness/Stage Stage 3 02/22/24 0800   Base slough;pink 02/22/24 0800   Periwound intact 02/22/24 0800   Periwound Temperature warm 02/22/24 0800   Periwound Skin Turgor soft 02/22/24 0800   Edges open 02/22/24 0800   Length (cm) 2.9 02/22/24 0800   Width (cm) 2 02/22/24 0800   Depth (cm) 0.2 02/22/24 0800   Wound (cm^2) 5.8 cm^2 02/22/24 0800   Wound Volume (cm^3) 1.16 cm^3 02/22/24 0800   Drainage Characteristics/Odor serosanguineous 02/22/24 0800   Drainage Amount moderate 02/22/24 0800   Care, Wound non-select wound debridement performed. 02/22/24 0800      Photo:             Further instructions from your care team         Jamaal Stahl      2005  A DME order for supplies ordered by HOME care  [facility] [phone] Fax [phone]  daily      PLAN: 2/22/24  Sathya supplements provided to increase protein intake  Pre-Albumen drawn   Patient planning to move to [place] 3/5/24  Discussion with Dr Fernandez regarding FLAP surgery: bilateral buttocks with bone biopsy  Patient will need a Temporary Colostomy BEFORE any surgery- will need to heal for at least 30 days then will schedule for FLAP surgery  Patient will need to call [facility] MD to discuss referral to [place] [facility] surgeon for Temporary Colostomy      Preparing for Flap Surgery: to be eligible for flap surgery:  Patient denies use: No nicotine use 2 months prior to surgery.  DONE Have Group 2 mattress in their home PRIOR to scheduling surgery.  Ideally this is initiated at first contact with patient if not already in home.  Must have bed in their permanent residence even if they are planning on rehabbing in a  "facility.  DONE Have a seating system that has been pressure mapped and assessed within the last year. If determined to need a new chair this needs to be available PRIOR to scheduling surgery.   Check today Prealbumin. Patient should be counseled ASAP on high protein diet, ideally should utilize a protein supplement.  Ongoing Demonstrate compliance with offloading. Wounds should demonstrate stability or improvement while preparing for surgery.   Str cath and Needs Ostomy   Expectations for rehab post-flap surgery:  100% bed rest with HOB <30 degrees for at least 4 weeks in a setting that has 24 hour care (TCU, SNF etc). If any issues with wound dehiscence this may be prolonged. It has been increasingly more difficult to find placement in facilities for patients post-flap, if patient has sufficient services in their current living situation rehab at home may be considered. Flap surgery does not guarantee facility placement.   Attend a 4 week post-op appointment at the [facility]. Patient must arrive via stretcher transport to be arranged by patient or facility.  This may cost $250-$800 out of pocket.   Patient may not sit up (in bed or otherwise) until cleared at the post-op appointment. At that point patient will be given a detailed seating protocol which will instruct patient how to gradually begin sitting up in bed. Next, patient's seating system will need to be pressure mapped before starting the wheelchair portion of the sitting protocol.     Continue to be nicotine free at least 6 weeks after surgery.        Wound Dressing Change: Right trochanter  - Wash your hands and prepare surface with soap and water  - Cleanse with mild unscented soap (such as Cetaphil, Cerave or Dove) and water  - Apply small amount of VASHE on gauze, lay into wound bed, let sit for 10 minutes, remove gauze   Pat wound dry then apply Hydrofera Blue dressing   - Cut and apply 1/6 of 4x5\" Hydrofera Blue Ready Transfer " "foam as wound filler, to fit size of wound  - cover with 5x5 Zetuvit Plus silicone border    Change daily and if needed for excess saturation     Wound Dressing Change: Coccyx  - Wash your hands and prepare surface with soap and water  - Cleanse with mild unscented soap (such as Cetaphil, Cerave or Dove) and water  - Apply small amount of VASHE on gauze, lay into wound bed, let sit for 10 minutes, remove gauze (do not rinse)  - fluff and tuck 1/4 roll of dry 4\" AMD roll gauze into undermine and wound defect  - cover with half 5x9\" ABD and secure with Medipore 2\" tape  Change twice daily and as needed for excess saturation    Wound Dressing Change: Right Ischial Tuberosity  - Cleanse with mild unscented soap (such as Cetaphil, Cerave or Dove) and water  - Apply small amount of VASHE on gauze, lay into wound bed, let sit for 10 minutes, remove gauze (do not rinse)  Cut and apply 1/6 piece of 4x4 Polymem Pink   Cover with 5x5 Zetuvit Plus Silicone border   Change Daily    Repositioning:  Bed: Reposition MINIMALLY every 1-2 hours in bed to relieve pressure and promote perfusion to tissue.  Patient is using a Group 2 mattress due to large, stage 4 pressure ulceration on the patient's pelvis that has failed to improve on a normal mattress despite regular wound cares and repositioning. A group 1 mattress is not adequate to offload these severe ulcerations. Patient has impaired sensation and is unable to reposition independently.   Chair: When up to the chair, do not sit for longer than one hour total before returning to bed for at least 60 minutes to relieve pressure and promote perfusion to the tissue.  Completely recline/tilt for 15 minutes each hour.  Sit on a chair cushion when up to the chair.     A diet high in protein is important for wound healing, we recommend getting 90 grams of protein per day. Taking protein shakes or bars are a good way to get extra protein in your diet.     Good sources of protein:  Pork 26g " per 3 oz  Whey protein powder - 24g per scoop (on average)  Greek yogurt - 23g per 8oz   Chicken or Turkey - 23g per 3oz  Fish - 20-25g per 3oz  Beef - 18-23g per 3oz  Navy beans - 20g per cup  Cottage cheese - 14g per 1/2 cup   Lentils - 13g per 1/4 cup  Beef jerky 13g per 1oz  2% milk - 8g per cup  Peanut butter - 8g per 2 tablespoons  Eggs - 6g per egg  Mixed nuts - 6g per 2oz     Main Provider: Lauri Fernandez M.D. February 22, 2024
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
## 2024-02-22 NOTE — TELEPHONE ENCOUNTER
Call out to [facility] to discuss DME order for the Patient.  Do they order supplies or not?

## 2024-02-23 ENCOUNTER — TELEPHONE (OUTPATIENT)
Dept: WOUND CARE | Facility: CLINIC | Age: 19
End: 2024-02-23
Payer: COMMERCIAL

## 2024-02-23 ENCOUNTER — MEDICAL CORRESPONDENCE (OUTPATIENT)
Dept: HEALTH INFORMATION MANAGEMENT | Facility: CLINIC | Age: 19
End: 2024-02-23
Payer: COMMERCIAL

## 2024-02-23 NOTE — TELEPHONE ENCOUNTER
[facility] unable to get Vashe covered by patient's insurance.  OK to continue using a water/vinegar solution?  Also, they have no Poly Mem, OK to substitute HydoFera Blue until they are able to get it?  Magi

## 2024-02-23 NOTE — TELEPHONE ENCOUNTER
Return call to Home care nurse; continue using Vinegar solution for wound wash and use Blue until Polymem arrives.

## 2024-02-29 ENCOUNTER — TELEPHONE (OUTPATIENT)
Dept: WOUND CARE | Facility: CLINIC | Age: 19
End: 2024-02-29
Payer: COMMERCIAL

## 2024-02-29 DIAGNOSIS — L89.154 PRESSURE INJURY OF COCCYGEAL REGION, STAGE 4 (H): ICD-10-CM

## 2024-02-29 DIAGNOSIS — L89.214 PRESSURE INJURY OF TROCHANTERIC REGION OF RIGHT HIP, STAGE 4 (H): Primary | ICD-10-CM

## 2024-02-29 DIAGNOSIS — L89.313 PRESSURE INJURY OF RIGHT PERINEAL ISCHIAL REGION, STAGE 3 (H): ICD-10-CM

## 2024-02-29 NOTE — TELEPHONE ENCOUNTER
[facility]    Who is the name of the provider?:  Rebecca    What is the location you see this provider at/preferred location?: [place]  Person calling / Facility: Susanna  Phone number:  [phone]   Nurse call back needed:  ??     Reason for call:  Called with information for ostomy referral:  To [facility] for [facility],  Phone: [phone],  Fax: [phone].      Pharmacy location:  NA  Outside Imaging: NA   Can we leave a detailed message on this number?  YES

## 2024-03-21 ENCOUNTER — HOSPITAL ENCOUNTER (OUTPATIENT)
Dept: WOUND CARE | Facility: CLINIC | Age: 19
Discharge: HOME | End: 2024-03-21
Attending: SURGERY
Payer: COMMERCIAL

## 2024-03-21 ENCOUNTER — MEDICAL CORRESPONDENCE (OUTPATIENT)
Dept: HEALTH INFORMATION MANAGEMENT | Facility: CLINIC | Age: 19
End: 2024-03-21

## 2024-03-21 NOTE — PROGRESS NOTES
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  Labs: No results for input(s): \"ALBUMIN\", \"GLUCOSE\", \"HGB\", \"INR\", \"WBC\", \"A1C\", \"CRP\" in the last 21260 hours.    Invalid input(s): \"PREALBUMIN\", \"MICROBIO\"  Nutrition requirements were discussed with patient today.  Vitals:  There were no vitals taken for this visit.  Wound:   Wound (used by OP [facility]I only) 04/28/23 1106 coccyx pressure injury (Active)   Thickness/Stage Stage 4 02/22/24 0800   Base granulating;slough 02/22/24 0800   Periwound macerated 02/22/24 0800   Periwound Temperature warm 02/22/24 0800   Periwound Skin Turgor soft 02/22/24 0800   Edges open 02/22/24 0800   Length (cm) 5 02/22/24 0800   Width (cm) 1.7 02/22/24 0800   Depth (cm) 1.9 02/22/24 0800   Wound (cm^2) 8.5 cm^2 02/22/24 0800   Wound Volume (cm^3) 16.15 cm^3 02/22/24 0800   Wound healing % 17.79 02/22/24 0800   Undermining [Depth (cm)/Location] 9-5 o' clock / 7.0 cm 02/22/24 0800   Drainage Characteristics/Odor serosanguineous 02/22/24 0800   Drainage Amount copious 02/22/24 0800   Care, Wound non-select wound debridement performed. 02/22/24 0800       Wound (used by OP WHI only) 04/28/23 1108 Right greater trochanter pressure injury (Active)   Thickness/Stage Stage 4 02/22/24 0800   Base granulating;slough 02/22/24 0800   Periwound intact 02/22/24 0800   Periwound Temperature warm 02/22/24 0800   Periwound Skin Turgor soft 02/22/24 0800   Edges open 02/22/24 0800   Length (cm) 1.9 02/22/24 0800   Width (cm) 3 02/22/24 0800   Depth (cm) 0.2 02/22/24 0800   Wound (cm^2) 5.7 cm^2 02/22/24 0800   Wound Volume (cm^3) 1.14 cm^3 02/22/24 0800   Wound healing % 80.77 02/22/24 0800   Drainage Characteristics/Odor tan;serous 02/22/24 0800   Drainage Amount large 02/22/24 0800   Care, Wound " non-select wound debridement performed. 02/22/24 0800       Wound (used by OP WHI only) 02/22/24 0811 Right ischial tuberosity pressure injury (Active)   Thickness/Stage Stage 3 02/22/24 0800   Base slough;pink 02/22/24 0800   Periwound intact 02/22/24 0800   Periwound Temperature warm 02/22/24 0800   Periwound Skin Turgor soft 02/22/24 0800   Edges open 02/22/24 0800   Length (cm) 2.9 02/22/24 0800   Width (cm) 2 02/22/24 0800   Depth (cm) 0.2 02/22/24 0800   Wound (cm^2) 5.8 cm^2 02/22/24 0800   Wound Volume (cm^3) 1.16 cm^3 02/22/24 0800   Drainage Characteristics/Odor serosanguineous 02/22/24 0800   Drainage Amount moderate 02/22/24 0800   Care, Wound non-select wound debridement performed. 02/22/24 0800      Photo: No images are attached to the encounter.      Further instructions from your care team         [facility]   2005  A DME order was not completed because the supplies are ordered by home care or at a care facility  [facility] [phone] Fax [phone]   Attn: Torres [phone]  [facility]: [phone] Fax: [phone]  Attn: Letitia Patricia     PLAN: 3/21/24- Patient no show for visit- POC discussed with Dr Fernandez  Next appointment scheduled In Person  Continue with High Protein foods, shakes, bars and Sathya supplements  Patient moved to [place] 3/5/24- [facility]  Appt: for Temporary Colostomy;  then 30 days post op prior to FLAP      To Prepare for Flap Surgery:   Patient denies use: No nicotine use 2 months prior to surgery.  DONE: Group 2 mattress in place   DONE: Wheel chair cushion; pressure mapped   Needs to improve Prealbumin level; needs a high protein diet, utilize a protein supplement.  Demonstrate compliance with offloading and proper wound care; Wounds should demonstrate stability or improvement while preparing for surgery.   Str cath and Needs Ostomy surgery  Expectations for rehab post-flap surgery:  100% bed rest with  "HOB <30 degrees for at least 4 weeks in a setting that has 24 hour care (TCU, SNF etc). If any issues with wound dehiscence this may be prolonged. It has been increasingly more difficult to find placement in facilities for patients post-flap, if patient has sufficient services in their current living situation rehab at home may be considered. Flap surgery does not guarantee facility placement.   Attend a 4 week post-op appointment at the [facility]. Patient must arrive via stretcher transport to be arranged by patient or facility.  This may cost $250-$800 out of pocket.   Patient may not sit up (in bed or otherwise) until cleared at the post-op appointment. At that point patient will be given a detailed seating protocol which will instruct patient how to gradually begin sitting up in bed. Next, patient's seating system will need to be pressure mapped before starting the wheelchair portion of the sitting protocol.     Continue to be nicotine free at least 6 weeks after surgery.        Wound Dressing Change: Right trochanter  - Wash your hands and prepare surface with soap and water  - Cleanse with mild unscented soap (such as Cetaphil, Cerave or Dove) and water  - Apply small amount of VASHE on gauze, lay into wound bed, let sit for 10 minutes, remove gauze   Cut and fluff a small piece of AMD roll gauze on to wound    Cover with 5x5 Zetuvit Plus silicone border and secure edges with 2\" Medipore tape  Consider using Skin prep; Cavilon; Tincture Benzoin under dressing  Change daily and as needed for soilage/ displacement      Wound Dressing Change: Coccyx  - Wash your hands and prepare surface with soap and water  - Cleanse with mild unscented soap (such as Cetaphil, Cerave or Dove) and water  - Apply small amount of VASHE on gauze, lay into wound bed, let sit for 10 minutes, remove gauze (do not rinse)  Cut and fluff piece of 4\" AMD roll gauze on to wound    Cover with 5x5 Zetuvit Plus silicone " "border and secure edges with 2\" Medipore tape  Consider using Skin prep; Cavilon; Tincture Benzoin under dressing  Change daily and as needed for soilage/ displacement     Wound Dressing Change: Right Ischial Tuberosity  - Cleanse with mild unscented soap (such as Cetaphil, Cerave or Dove) and water  - Apply small amount of VASHE on gauze, lay into wound bed, let sit for 10 minutes, remove gauze (do not rinse)  Cut and fluff a small piece of AMD roll gauze on to wound    Cover with 5x5 Zetuvit Plus silicone border and secure edges with 2\" Medipore tape  Consider using Skin prep; Cavilon; Tincture Benzoin under dressing  Change daily and as needed for soilage/ displacement     Repositioning:  Bed: Reposition MINIMALLY every 1-2 hours in bed to relieve pressure and promote perfusion to tissue.  Patient is using a Group 2 mattress due to large, stage 4 pressure ulceration on the patient's pelvis that has failed to improve on a normal mattress despite regular wound cares and repositioning. A group 1 mattress is not adequate to offload these severe ulcerations. Patient has impaired sensation and is unable to reposition independently.   Chair: Use chair cushion when up to the chair, do not sit for longer than one hour total before returning to bed for at least 60 minutes to relieve pressure and promote perfusion to the tissue.  Completely recline/tilt for 15 minutes each hour.      Continue: diet high in protein: shakes or bars and foods throughout the day     Main Provider: Lauri Fernandez M.D. March 21, 2024  "

## 2024-03-21 NOTE — DISCHARGE INSTRUCTIONS
[facility]   2005  A DME order was not completed because the supplies are ordered by home care or at a care facility  [facility] [phone] Fax [phone]   Attn: Torres [phone]  [facility]: [phone] Fax: [phone]  Attn: Letitia Patricia     PLAN: 3/21/24- Patient no show for visit- POC discussed with Dr Fernandez  Next appointment scheduled In Person  Continue with High Protein foods, shakes, bars and Sathya supplements  Patient moved to [place] 3/5/24- [facility]  Appt: for Temporary Colostomy;  then 30 days post op prior to FLAP      To Prepare for Flap Surgery:   Patient denies use: No nicotine use 2 months prior to surgery.  DONE: Group 2 mattress in place   DONE: Wheel chair cushion; pressure mapped   Needs to improve Prealbumin level; needs a high protein diet, utilize a protein supplement.  Demonstrate compliance with offloading and proper wound care; Wounds should demonstrate stability or improvement while preparing for surgery.   Str cath and Needs Ostomy surgery  Expectations for rehab post-flap surgery:  100% bed rest with HOB <30 degrees for at least 4 weeks in a setting that has 24 hour care (TCU, SNF etc). If any issues with wound dehiscence this may be prolonged. It has been increasingly more difficult to find placement in facilities for patients post-flap, if patient has sufficient services in their current living situation rehab at home may be considered. Flap surgery does not guarantee facility placement.   Attend a 4 week post-op appointment at the [facility]. Patient must arrive via stretcher transport to be arranged by patient or facility.  This may cost $250-$800 out of pocket.   Patient may not sit up (in bed or otherwise) until cleared at the post-op appointment. At that point patient will be given a detailed seating protocol which will instruct patient how to gradually begin sitting up in bed. Next,  "patient's seating system will need to be pressure mapped before starting the wheelchair portion of the sitting protocol.     Continue to be nicotine free at least 6 weeks after surgery.        Wound Dressing Change: Right trochanter  - Wash your hands and prepare surface with soap and water  - Cleanse with mild unscented soap (such as Cetaphil, Cerave or Dove) and water  - Apply small amount of VASHE on gauze, lay into wound bed, let sit for 10 minutes, remove gauze   Cut and fluff a small piece of AMD roll gauze on to wound    Cover with 5x5 Zetuvit Plus silicone border and secure edges with 2\" Medipore tape  Consider using Skin prep; Cavilon; Tincture Benzoin under dressing  Change daily and as needed for soilage/ displacement      Wound Dressing Change: Coccyx  - Wash your hands and prepare surface with soap and water  - Cleanse with mild unscented soap (such as Cetaphil, Cerave or Dove) and water  - Apply small amount of VASHE on gauze, lay into wound bed, let sit for 10 minutes, remove gauze (do not rinse)  Cut and fluff piece of 4\" AMD roll gauze on to wound    Cover with 5x5 Zetuvit Plus silicone border and secure edges with 2\" Medipore tape  Consider using Skin prep; Cavilon; Tincture Benzoin under dressing  Change daily and as needed for soilage/ displacement     Wound Dressing Change: Right Ischial Tuberosity  - Cleanse with mild unscented soap (such as Cetaphil, Cerave or Dove) and water  - Apply small amount of VASHE on gauze, lay into wound bed, let sit for 10 minutes, remove gauze (do not rinse)  Cut and fluff a small piece of AMD roll gauze on to wound    Cover with 5x5 Zetuvit Plus silicone border and secure edges with 2\" Medipore tape  Consider using Skin prep; Cavilon; Tincture Benzoin under dressing  Change daily and as needed for soilage/ displacement     Repositioning:  Bed: Reposition MINIMALLY every 1-2 hours in bed to relieve pressure and promote perfusion to tissue.  Patient is using a Group " 2 mattress due to large, stage 4 pressure ulceration on the patient's pelvis that has failed to improve on a normal mattress despite regular wound cares and repositioning. A group 1 mattress is not adequate to offload these severe ulcerations. Patient has impaired sensation and is unable to reposition independently.   Chair: Use chair cushion when up to the chair, do not sit for longer than one hour total before returning to bed for at least 60 minutes to relieve pressure and promote perfusion to the tissue.  Completely recline/tilt for 15 minutes each hour.      Continue: diet high in protein: shakes or bars and foods throughout the day     Main Provider: Lauri Fernandez M.D. March 21, 2024    Call us at [phone] if you have any questions about your wounds, have redness or swelling around your wound, have a fever of 101 degrees Fahrenheit or greater or if you have any other problems or concerns. We answer the phone Monday through Friday 8 am to 4 pm, please leave a message as we check the voicemail frequently throughout the day.     If you had a positive experience please indicate that on your patient satisfaction survey form that [facility] will be sending you.  It was a pleasure meeting with you today.  Thank you for allowing me and my team the privilege of caring for you today.  YOU are the reason we are here, and I truly hope we provided you with the excellent service you deserve.  Please let us know if there is anything else we can do for you so that we can be sure you are leaving completely satisfied with your care experience.      If you have any billing related questions please call the [facility] Business office at [phone]. The clinic staff does not handle billing related matters.  If you are scheduled to have a follow up appointment, you will receive a reminder call the day before your visit. On the appointment day please arrive 15 minutes prior to your appointment time. If you are  unable to keep that appointment, please call the clinic to cancel or reschedule. If you are more than 10 minutes late or greater for your scheduled appointment time, the clinic policy is that you may be asked to reschedule.

## 2024-03-21 NOTE — PROGRESS NOTES
Attempted Video visit with Patient and Dr Fernandez at scheduled time. Photos are available in Care Everywhere.  [facility] Nurse, Letitia Patricia;[phone]  arrived to home for the appointment but Patient was unwilling to get up. She states that he was vomiting last night.     [facility] nurse, Torres called to discuss the daily complications of dressing not holding for more than 3 hours at  a time. Unknown reason why the dressing is not holding.   [facility] is asking about starting NPWT therapy.      Alternate dressing care discussed with Dr. Fernandez and will plan to fax to [facility] and [facility]. More appointments made for Patient.

## 2024-03-22 ENCOUNTER — MEDICAL CORRESPONDENCE (OUTPATIENT)
Dept: HEALTH INFORMATION MANAGEMENT | Facility: CLINIC | Age: 19
End: 2024-03-22
Payer: COMMERCIAL

## 2024-03-22 ENCOUNTER — TELEPHONE (OUTPATIENT)
Dept: WOUND CARE | Facility: CLINIC | Age: 19
End: 2024-03-22
Payer: COMMERCIAL

## 2024-03-29 ENCOUNTER — TRANSFERRED RECORDS (OUTPATIENT)
Dept: HEALTH INFORMATION MANAGEMENT | Facility: CLINIC | Age: 19
End: 2024-03-29

## 2024-04-08 ENCOUNTER — MEDICAL CORRESPONDENCE (OUTPATIENT)
Dept: HEALTH INFORMATION MANAGEMENT | Facility: CLINIC | Age: 19
End: 2024-04-08
Payer: COMMERCIAL

## 2024-04-15 ENCOUNTER — MEDICAL CORRESPONDENCE (OUTPATIENT)
Dept: HEALTH INFORMATION MANAGEMENT | Facility: CLINIC | Age: 19
End: 2024-04-15
Payer: COMMERCIAL

## 2024-04-25 ENCOUNTER — HOSPITAL ENCOUNTER (OUTPATIENT)
Dept: WOUND CARE | Facility: CLINIC | Age: 19
Discharge: HOME | End: 2024-04-25
Attending: SURGERY | Admitting: SURGERY
Payer: COMMERCIAL

## 2024-04-25 VITALS — DIASTOLIC BLOOD PRESSURE: 81 MMHG | TEMPERATURE: 96 F | HEART RATE: 101 BPM | SYSTOLIC BLOOD PRESSURE: 119 MMHG

## 2024-04-25 DIAGNOSIS — L89.313 PRESSURE INJURY OF RIGHT PERINEAL ISCHIAL REGION, STAGE 3 (H): ICD-10-CM

## 2024-04-25 DIAGNOSIS — L89.214 PRESSURE INJURY OF TROCHANTERIC REGION OF RIGHT HIP, STAGE 4 (H): ICD-10-CM

## 2024-04-25 DIAGNOSIS — L89.154 PRESSURE INJURY OF COCCYGEAL REGION, STAGE 4 (H): Primary | ICD-10-CM

## 2024-04-25 PROCEDURE — 97602 WOUND(S) CARE NON-SELECTIVE: CPT
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
## 2024-04-25 NOTE — DISCHARGE INSTRUCTIONS
04/25/2024   Jamaal West   2005  A DME order for supplies ordered by Home care  [facility] [phone] Fax [phone]    PLAN: 4/25/24   Daily Home Care wound visits  Preparing for Flap Surgery-  will call when open  Sathya supplements provided to increase protein intake  Need History & Physical 30 days prior to surgery  Done: Temporary Colostomy and straight cath  Patient denies use: No nicotine use 2 months prior to surgery.  Done: Patient is using a Group 2 mattress due to large, stage 4 pressure ulceration on the patient's pelvis that has failed to improve on a normal mattress despite regular wound cares and repositioning. A group 1 mattress is not adequate to offload these severe ulcerations. Patient has impaired sensation and is unable to reposition independently.  Done: wheel chair cushion pressure mapped   Ongoing compliance with offloading    Expectations for rehab post-flap surgery:  100% bed rest with HOB <30 degrees for at least 4 weeks in a setting that has 24 hour care (TCU, SNF etc). If any issues with wound dehiscence this may be prolonged. It has been increasingly more difficult to find placement in facilities for patients post-flap, if patient has sufficient services in their current living situation rehab at home may be considered. Flap surgery does not guarantee facility placement.  Attend a 4 week post-op appointment at the [facility].  Patient must arrive via stretcher transport to be arranged by patient or facility. This may cost $250-$800 out of pocket.  Patient may not sit up (in bed or otherwise) until cleared at the post-op appointment. At that point patient will be given a detailed seating protocol which will instruct patient how to gradually begin sitting up in bed. Next, patient's seating system will need to be pressure mapped before starting the wheelchair portion of the sitting protocol.  Continue to be nicotine free at least 6 weeks  "after surgery.    Wound Dressing Change: Right trochanter  - Wash your hands and prepare surface with soap and water  - Cleanse with mild unscented soap (such as Cetaphil, Cerave or Dove) and water  - Apply small amount of VASHE on gauze, to wound bed, for 10 minutes, remove gauze  Cut and apply 1/5 piece of 4x4 Polymem Pink  - cover with 5x5 Zetuvit Plus silicone border  Change daily     Wound Dressing Change: Right Ischial Tuberosity  - Cleanse with mild unscented soap (such as Cetaphil, Cerave or Dove) and water  - Apply small amount of VASHE on gauze,to wound bed,for 10 minutes, remove gauze   Cut and apply 1/5 piece of 4x4 Polymem Pink  Cover with 5x5 Zetuvit Plus Silicone border  Change Daily    Wound Dressing Change: Coccyx  - Wash your hands and prepare surface with soap and water  - Cleanse with mild unscented soap (such as Cetaphil, Cerave or Dove) and water  - Apply small amount of VASHE on gauze,to wound bed,for 10 minutes, remove gauze   - fluff and tuck 1/4 roll of dry 4\" AMD roll gauze into undermine and wound defect  - cover with half 5x9\" ABD and secure with Medipore 2\" tape  Change twice daily     Repositioning:  Bed: Reposition MINIMALLY every 1-2 hours in bed to relieve pressure and promote perfusion to tissue.  Patient is using a Group 2 mattress due to large, stage 4 pressure ulceration on the patient's pelvis that has failed to improve on a normal mattress despite regular wound cares and repositioning. A group 1 mattress is not adequate to offload these severe ulcerations. Patient has impaired sensation and is unable to reposition independently.  Chair: When up to the chair, do not sit for longer than one hour total before returning to bed for at least 60 minutes to relieve pressure and promote perfusion to the tissue. Completely recline/tilt for 15 minutes each hour.  Sit on a chair cushion when up to the chair.    A diet high in protein is important for wound healing, we recommend getting 90 " grams of protein per day. Taking protein shakes or bars are a good way to get extra protein in your diet.    Good sources of protein:  Pork 26g per 3 oz  Whey protein powder - 24g per scoop (on average)  Greek yogurt - 23g per 8oz  Chicken or Turkey - 23g per 3oz  Fish - 20-25g per 3oz  Beef - 18-23g per 3oz  Navy beans - 20g per cup  Cottage cheese - 14g per 1/2 cup  Lentils - 13g per 1/4 cup  Beef jerky 13g per 1oz  2% milk - 8g per cup  Peanut butter - 8g per 2 tablespoons  Eggs - 6g per egg  Mixed nuts - 6g per 2oz     Main Provider: Lauri Fernandez M.D. April 25, 2024    Call us at [phone] if you have any questions about your wounds, if you have redness or swelling around your wound, have a fever of 101 degrees Fahrenheit or greater or if you have any other problems or concerns. We answer the phone Monday through Friday 8 am to 4 pm, please leave a message as we check the voicemail frequently throughout the day. If you have a concern over the weekend, please leave a message and we will return your call Monday. If the need is urgent, go to the ER or urgent care.    If you had a positive experience please indicate that on your patient satisfaction survey form that [facility] will be sending you.  It was a pleasure meeting with you today.  Thank you for allowing me and my team the privilege of caring for you today.  YOU are the reason we are here, and I truly hope we provided you with the excellent service you deserve.  Please let us know if there is anything else we can do for you so that we can be sure you are leaving completely satisfied with your care experience.      If you have any billing related questions please call the [facility] Business office at [phone]. The clinic staff does not handle billing related matters.  If you are scheduled to have a follow up appointment, you will receive a reminder call the day before your visit. On the appointment day please arrive 15 minutes prior to  your appointment time. If you are unable to keep that appointment, please call the clinic to cancel or reschedule. If you are more than 10 minutes late or greater for your scheduled appointment time, the clinic policy is that you may be asked to reschedule.

## 2024-04-25 NOTE — PROGRESS NOTES
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  Labs: No results for input(s): \"ALBUMIN\", \"GLUCOSE\", \"HGB\", \"INR\", \"WBC\", \"A1C\", \"CRP\" in the last 10538 hours.    Invalid input(s): \"PREALBUMIN\", \"MICROBIO\"  Nutrition requirements were discussed with patient today.  Vitals:  /81 (BP Location: Left arm, Patient Position: Chair, Cuff Size: Adult Regular)   Pulse 101   Temp (!) 96  F (35.6  C)   Wound:   Wound (used by OP [facility] only) 04/28/23 1106 coccyx pressure injury (Active)   Thickness/Stage Stage 4 04/25/24 1020   Base granulating 04/25/24 1020   Periwound macerated 04/25/24 1020   Periwound Temperature warm 04/25/24 1020   Periwound Skin Turgor soft 04/25/24 1020   Edges open 04/25/24 1020   Length (cm) 5 04/25/24 1020   Width (cm) 2.5 04/25/24 1020   Depth (cm) 2.5 04/25/24 1020   Wound (cm^2) 12.5 cm^2 04/25/24 1020   Wound Volume (cm^3) 31.25 cm^3 04/25/24 1020   Wound healing % -20.89 04/25/24 1020   Tunneling [Depth (cm)/Location] 12 oclock/ 7.2 cm and 11 oclock/ 8 cm 01/23/24 0855   Undermining [Depth (cm)/Location] 9-5 oclock/ 6.5 cm 04/25/24 1020   Drainage Characteristics/Odor serosanguineous 04/25/24 1020   Drainage Amount copious 04/25/24 1020   Care, Wound non-select wound debridement performed. 04/25/24 1020       Wound (used by OP [facility] only) 04/28/23 1108 Right greater trochanter pressure injury (Active)   Thickness/Stage Stage 4 04/25/24 1020   Base granulating 04/25/24 1020   Periwound intact 04/25/24 1020   Periwound Temperature warm 04/25/24 1020   Periwound Skin Turgor soft 04/25/24 1020   Edges open 04/25/24 1020   Length (cm) 1 04/25/24 1020   Width (cm) 2.5 04/25/24 1020   Depth (cm) 0.1 04/25/24 1020   Wound (cm^2) 2.5 cm^2 04/25/24 1020   Wound Volume (cm^3) 0.25 cm^3 " 04/25/24 1020   Wound healing % 91.57 04/25/24 1020   Drainage Characteristics/Odor serosanguineous 04/25/24 1020   Drainage Amount moderate 04/25/24 1020   Care, Wound non-select wound debridement performed. 04/25/24 1020       Wound (used by OP [facility] only) 02/22/24 0811 Right ischial tuberosity pressure injury (Active)   Thickness/Stage Stage 3 04/25/24 1020   Base granulating 04/25/24 1020   Periwound macerated 04/25/24 1020   Periwound Temperature warm 04/25/24 1020   Periwound Skin Turgor soft 04/25/24 1020   Edges open 04/25/24 1020   Length (cm) 1.5 04/25/24 1020   Width (cm) 1.1 04/25/24 1020   Depth (cm) 0.1 04/25/24 1020   Wound (cm^2) 1.65 cm^2 04/25/24 1020   Wound Volume (cm^3) 0.17 cm^3 04/25/24 1020   Wound healing % 71.55 04/25/24 1020   Drainage Characteristics/Odor serosanguineous 04/25/24 1020   Drainage Amount moderate 04/25/24 1020   Care, Wound non-select wound debridement performed. 04/25/24 1020      Photo:             Further instructions from your care team         04/25/2024   Jamaal West   2005  A DME order for supplies ordered by Home care  [facility] [phone] Fax [phone]    PLAN: 4/25/24   Daily Home Care wound visits  Preparing for Flap Surgery-  will call when open  Sathya supplements provided to increase protein intake  Need History & Physical 30 days prior to surgery  Done: Temporary Colostomy and straight cath  Patient denies use: No nicotine use 2 months prior to surgery.  Done: Patient is using a Group 2 mattress due to large, stage 4 pressure ulceration on the patient's pelvis that has failed to improve on a normal mattress despite regular wound cares and repositioning. A group 1 mattress is not adequate to offload these severe ulcerations. Patient has impaired sensation and is unable to reposition independently.  Done: wheel chair cushion pressure mapped   Ongoing compliance with offloading    Expectations for rehab post-flap surgery:  100% bed  rest with HOB <30 degrees for at least 4 weeks in a setting that has 24 hour care (TCU, SNF etc). If any issues with wound dehiscence this may be prolonged. It has been increasingly more difficult to find placement in facilities for patients post-flap, if patient has sufficient services in their current living situation rehab at home may be considered. Flap surgery does not guarantee facility placement.  Attend a 4 week post-op appointment at the [facility].  Patient must arrive via stretcher transport to be arranged by patient or facility. This may cost $250-$800 out of pocket.  Patient may not sit up (in bed or otherwise) until cleared at the post-op appointment. At that point patient will be given a detailed seating protocol which will instruct patient how to gradually begin sitting up in bed. Next, patient's seating system will need to be pressure mapped before starting the wheelchair portion of the sitting protocol.  Continue to be nicotine free at least 6 weeks after surgery.    Wound Dressing Change: Right trochanter  - Wash your hands and prepare surface with soap and water  - Cleanse with mild unscented soap (such as Cetaphil, Cerave or Dove) and water  - Apply small amount of VASHE on gauze, to wound bed, for 10 minutes, remove gauze  Cut and apply 1/5 piece of 4x4 Polymem Pink  - cover with 5x5 Zetuvit Plus silicone border  Change daily     Wound Dressing Change: Right Ischial Tuberosity  - Cleanse with mild unscented soap (such as Cetaphil, Cerave or Dove) and water  - Apply small amount of VASHE on gauze,to wound bed,for 10 minutes, remove gauze   Cut and apply 1/5 piece of 4x4 Polymem Pink  Cover with 5x5 Zetuvit Plus Silicone border  Change Daily    Wound Dressing Change: Coccyx  - Wash your hands and prepare surface with soap and water  - Cleanse with mild unscented soap (such as Cetaphil, Cerave or Dove) and water  - Apply small amount of VASHE on gauze,to wound bed,for 10  "minutes, remove gauze   - fluff and tuck 1/4 roll of dry 4\" AMD roll gauze into undermine and wound defect  - cover with half 5x9\" ABD and secure with Medipore 2\" tape  Change twice daily     Repositioning:  Bed: Reposition MINIMALLY every 1-2 hours in bed to relieve pressure and promote perfusion to tissue.  Patient is using a Group 2 mattress due to large, stage 4 pressure ulceration on the patient's pelvis that has failed to improve on a normal mattress despite regular wound cares and repositioning. A group 1 mattress is not adequate to offload these severe ulcerations. Patient has impaired sensation and is unable to reposition independently.  Chair: When up to the chair, do not sit for longer than one hour total before returning to bed for at least 60 minutes to relieve pressure and promote perfusion to the tissue. Completely recline/tilt for 15 minutes each hour.  Sit on a chair cushion when up to the chair.    A diet high in protein is important for wound healing, we recommend getting 90 grams of protein per day. Taking protein shakes or bars are a good way to get extra protein in your diet.    Good sources of protein:  Pork 26g per 3 oz  Whey protein powder - 24g per scoop (on average)  Greek yogurt - 23g per 8oz  Chicken or Turkey - 23g per 3oz  Fish - 20-25g per 3oz  Beef - 18-23g per 3oz  Navy beans - 20g per cup  Cottage cheese - 14g per 1/2 cup  Lentils - 13g per 1/4 cup  Beef jerky 13g per 1oz  2% milk - 8g per cup  Peanut butter - 8g per 2 tablespoons  Eggs - 6g per egg  Mixed nuts - 6g per 2oz     Main Provider: Lauri Fernandez M.D. April 25, 2024  "

## 2024-05-13 ENCOUNTER — TELEPHONE (OUTPATIENT)
Dept: WOUND CARE | Facility: CLINIC | Age: 19
End: 2024-05-13

## 2024-05-13 NOTE — TELEPHONE ENCOUNTER
Torres RIVERA with [facility] called to update that they had been trying to order supplies for the patient through [facility] but Johnson is requesting an updated Rx from the [facility]. Johnson was using an incorrect fax number for the [facility] but will be given the correct number from Torres. Rx request will be faxed to the [facility] asap.     No further action requested, but if needed Torres can be reached at [phone]

## 2024-05-23 ENCOUNTER — MEDICAL CORRESPONDENCE (OUTPATIENT)
Dept: HEALTH INFORMATION MANAGEMENT | Facility: CLINIC | Age: 19
End: 2024-05-23

## 2024-05-23 DIAGNOSIS — L98.429 STAGE 4 SKIN ULCER OF SACRAL REGION (H): Primary | ICD-10-CM

## 2024-05-23 DIAGNOSIS — G82.20 PARAPLEGIA (H): ICD-10-CM

## 2024-05-24 ENCOUNTER — HOSPITAL ENCOUNTER (OUTPATIENT)
Facility: CLINIC | Age: 19
End: 2024-05-24
Attending: SURGERY | Admitting: SURGERY

## 2024-05-28 ENCOUNTER — TELEPHONE (OUTPATIENT)
Dept: PLASTIC SURGERY | Facility: CLINIC | Age: 19
End: 2024-05-28

## 2024-05-28 ENCOUNTER — TELEPHONE (OUTPATIENT)
Dept: WOUND CARE | Facility: CLINIC | Age: 19
End: 2024-05-28

## 2024-05-28 NOTE — TELEPHONE ENCOUNTER
Christin, physical therapist with [facility], is asking to speak with someone regarding the plan and any available options for the patient's wheel chair after his procedure with Dr Fernandez.     [phone]

## 2024-05-28 NOTE — TELEPHONE ENCOUNTER
Return call to Christin OT, [facility] who states that she is working on a new wheel chair, standing chair and cushion for the Patient.   Education provided for post op FLAP protocol in bed and eventual sitting protocol. All concerns addressed.  New wheel chair will take about 3 months.   Will discuss with Dr Fernandez on 5/30/24.

## 2024-05-28 NOTE — TELEPHONE ENCOUNTER
RN Care Coordinator: Martha Yaneth    Surgery is scheduled with Dr. Fernandez  Date: 6/14/2024   Location: [facility]    H&P to be completed by Primary Care team - patient instructed to schedule PCP - Per patient, this will be scheduled with GAIL Cee     Post-op visit(s):     To be scheduled by [facility]      Patient will receive a phone call from surgery center pre-operative nurses 3-5 days prior to surgery with arrival time and NPO instructions.     Spoke with patient, confirmed all scheduled information.       Patient questions/concerns: N/A       Surgery packet to be sent via US mail    __    Renea Morales on 5/28/2024 at 3:10 PM  P: [phone]

## 2024-05-30 ENCOUNTER — TELEPHONE (OUTPATIENT)
Dept: WOUND CARE | Facility: CLINIC | Age: 19
End: 2024-05-30

## 2024-05-30 ENCOUNTER — HOSPITAL ENCOUNTER (OUTPATIENT)
Dept: WOUND CARE | Facility: CLINIC | Age: 19
Discharge: HOME | End: 2024-05-30
Attending: SURGERY | Admitting: SURGERY

## 2024-05-30 DIAGNOSIS — L89.214 PRESSURE INJURY OF TROCHANTERIC REGION OF RIGHT HIP, STAGE 4 (H): Primary | ICD-10-CM

## 2024-05-30 DIAGNOSIS — L89.154 PRESSURE INJURY OF COCCYGEAL REGION, STAGE 4 (H): ICD-10-CM

## 2024-05-30 PROCEDURE — 99213 OFFICE O/P EST LOW 20 MIN: CPT | Performed by: SURGERY

## 2024-05-30 PROCEDURE — 97602 WOUND(S) CARE NON-SELECTIVE: CPT

## 2024-05-30 NOTE — TELEPHONE ENCOUNTER
Paperwork completed by Dr Fernandez and faxed to Christin, Physical therapy, [facility] [phone]

## 2024-05-30 NOTE — TELEPHONE ENCOUNTER
[facility] center left message, would like Dr Fernandez to sign paperwork for a new wheelchair during patient's visit with her on 5.30.  Elizabeth
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
## 2024-05-30 NOTE — DISCHARGE INSTRUCTIONS
05/30/2024   Jamaal Cullom   2005  A DME order for supplies ordered by Home care  [facility] [phone] Fax [phone]     PLAN: 05/30/24   Daily Home Care wound visits  Plan 6/17/24: Flap Surgery with Dr Fernandez; plan to recover in Group Home; if needs more than 1/day Antibiotic will need to go to a facility. Will be in hospital 3-5 days  Will need to lie flat in bed for 1 month; changing positions side to side ONLY; head of bed <30 degrees even with meals; will have Home Care nurse to monitor incision and provide wound care; one month appointment can be video if Home care nurse is present. Will begin sitting protocol slowly if incision OK.    Continue Sathya supplements provided to increase protein intake  Done: History & Physical 30 days prior to surgery  Done: Temporary Colostomy and straight cath  Patient denies use: No nicotine use past 4 months   Done: Patient is using a Group 2 mattress due to large, stage 4 pressure ulceration on the patient's pelvis that has failed to improve on a normal mattress despite regular wound cares and repositioning. A group 1 mattress is not adequate to offload these severe ulcerations. Patient has impaired sensation and is unable to reposition independently.  Done: wheel chair cushion pressure mapped   August: due new Power Wheelchair with standing capability and tilt and space feature  Ongoing compliance with offloading     Expectations for rehab post-flap surgery:  100% bed rest with HOB <30 degrees for at least 4 weeks in a setting that has 24 hour care (TCU, SNF etc). If any issues with wound dehiscence this may be prolonged. It has been increasingly more difficult to find placement in facilities for patients post-flap, if patient has sufficient services in their current living situation rehab at home may be considered. Flap surgery does not guarantee facility placement.  Attend a 4 week post-op appointment at the [facility]. Patient must arrive via stretcher transport to be arranged by patient or facility. This may cost $250-$800 out of pocket. Patient may not sit up (in bed or otherwise) until cleared at the post-op appointment. At that point patient will be given a detailed seating protocol which will instruct patient how to gradually begin sitting up in bed. Next, patient's seating system will need to be pressure mapped before starting the wheelchair portion of the sitting protocol.  Continue to be nicotine free at least 8 weeks after surgery.     Wound Dressing Change: Right trochanter  - Wash your hands and prepare surface with soap and water  - Cleanse with mild unscented soap (such as Cetaphil, Cerave or Dove) and water  - Apply small amount of VASHE on gauze, to wound bed, for 10 minutes, remove gauze  Cut and apply 1/5 piece of 4x4 Polymem Pink  - cover with 5x5 Zetuvit Plus silicone border  Change daily and as needed for soilage     Wound Dressing Change: Right Ischial Tuberosity  - Cleanse with mild unscented soap (such as Cetaphil, Cerave or Dove) and water  Cover with 5x5 Zetuvit Plus Silicone border  Change Daily and as needed for soilage     Wound Dressing Change: Coccyx  - Wash your hands and prepare surface with soap and water  - Cleanse with mild unscented soap (such as Cetaphil, Cerave or Dove) and water  - Apply small amount of VASHE on gauze,to wound bed, for 10 minutes, remove gauze   - fluff and tuck 1/4 roll of dry 4\" AMD roll gauze into undermine and wound defect  - cover with 5x9\" ABD and secure with Medipore 2\" tape  Change twice daily      Repositioning:  Bed: Reposition MINIMALLY every 1-2 hours in bed to relieve pressure and promote perfusion to tissue.  Patient is using a Group 2 mattress due to large, stage 4 pressure ulceration on the patient's pelvis that has failed to improve on a normal mattress despite regular wound cares and repositioning. A group 1 mattress is not adequate to offload these " severe ulcerations. Patient has impaired sensation and is unable to reposition independently.  Chair: When up to the chair, do not sit for longer than one hour total before returning to bed for at least 60 minutes to relieve pressure and promote perfusion to the tissue. Completely recline/tilt for 15 minutes each hour.  Sit on a chair cushion when up to the chair.     A diet high in protein is important for wound healing, we recommend getting 90 grams of protein per day. Taking protein shakes or bars are a good way to get extra protein in your diet. Good sources of protein:  Pork 26g per 3 oz  Whey protein powder - 24g per scoop (on average)  Greek yogurt - 23g per 8oz  Chicken or Turkey - 23g per 3oz  Fish - 20-25g per 3oz  Beef - 18-23g per 3oz  Navy beans - 20g per cup  Cottage cheese - 14g per 1/2 cup  Lentils - 13g per 1/4 cup  Beef jerky 13g per 1oz  2% milk - 8g per cup  Peanut butter - 8g per 2 tablespoons  Eggs - 6g per egg  Mixed nuts - 6g per 2oz     Main Provider: Lauri Fernandez M.D. May 30, 2024    Call us at [phone] if you have any questions about your wounds, if you have redness or swelling around your wound, have a fever of 101 degrees Fahrenheit or greater or if you have any other problems or concerns. We answer the phone Monday through Friday 8 am to 4 pm, please leave a message as we check the voicemail frequently throughout the day. If you have a concern over the weekend, please leave a message and we will return your call Monday. If the need is urgent, go to the ER or urgent care.    If you had a positive experience please indicate that on your patient satisfaction survey form that [facility] will be sending you.  It was a pleasure meeting with you today.  Thank you for allowing me and my team the privilege of caring for you today.  YOU are the reason we are here, and I truly hope we provided you with the excellent service you deserve.  Please let us know if there is anything  else we can do for you so that we can be sure you are leaving completely satisfied with your care experience.      If you have any billing related questions please call the [facility] Business office at [phone]. The clinic staff does not handle billing related matters.  If you are scheduled to have a follow up appointment, you will receive a reminder call the day before your visit. On the appointment day please arrive 15 minutes prior to your appointment time. If you are unable to keep that appointment, please call the clinic to cancel or reschedule. If you are more than 10 minutes late or greater for your scheduled appointment time, the clinic policy is that you may be asked to reschedule.

## 2024-06-05 ENCOUNTER — TELEPHONE (OUTPATIENT)
Dept: WOUND CARE | Facility: CLINIC | Age: 19
End: 2024-06-05

## 2024-06-05 NOTE — TELEPHONE ENCOUNTER
Torres from [facility] called, wanted to give team a heads up that patient is planning to return to group home after surgery.  [facility] will not be able to take him back, they do not feel the group home is an appropriate place for the patient to recover after surgery.  [phone]

## 2024-06-05 NOTE — TELEPHONE ENCOUNTER
LVM to call clinic back to discuss her concern with Group Home.   Lengthy conversation with Patient and Group Home staff member with Dr Fernandez to discuss post op placement in Group home vs TCU. Dr Fernandez was assured that the Group Home would be able to provide a safe environment for the Patient.
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
## 2024-06-06 ENCOUNTER — TELEPHONE (OUTPATIENT)
Dept: WOUND CARE | Facility: CLINIC | Age: 19
End: 2024-06-06

## 2024-06-06 NOTE — TELEPHONE ENCOUNTER
Return call to [facility] to discuss concerns about the Patient and Group Home. Informed that surgery will be postponed until compliance can be determined.

## 2024-06-06 NOTE — TELEPHONE ENCOUNTER
LVM to inform Patient that surgery will be cancelled due to [facility] nurse information on non compliance.   Also, wheel chair is under construction and Dr Fernandez prefers to wait for delivery of the chair and then possibly schedule surgery.

## 2024-06-13 ENCOUNTER — TELEPHONE (OUTPATIENT)
Dept: WOUND CARE | Facility: CLINIC | Age: 19
End: 2024-06-13

## 2024-06-13 NOTE — TELEPHONE ENCOUNTER
[facility]    Who is the name of the provider?:  Rebecca    What is the location you see this provider at/preferred location?: [place]  Person calling / Facility: [facility]   Phone number:  [phone]   Nurse call back needed:  YES     Reason for call:  Please call patient to discuss the postponement of his surgery surgery.      Pharmacy location:  NA  Outside Imaging: NA   Can we leave a detailed message on this number?  YES

## 2024-06-14 NOTE — TELEPHONE ENCOUNTER
Return call to Patient and informed that he is postponed not cancelled for the surgery with Dr Fernandez.     Patient requests to go to a TCU after the FLAP surgery and not back to the Group Home.  No further questions or concerns.

## 2024-06-20 ENCOUNTER — MEDICAL CORRESPONDENCE (OUTPATIENT)
Dept: HEALTH INFORMATION MANAGEMENT | Facility: CLINIC | Age: 19
End: 2024-06-20

## 2024-07-08 ENCOUNTER — TELEPHONE (OUTPATIENT)
Dept: WOUND CARE | Facility: CLINIC | Age: 19
End: 2024-07-08

## 2024-07-08 NOTE — TELEPHONE ENCOUNTER
Jessica with [employer] left a vm stating that she has sent a letter of medical necessity for the power chair and needs the letter signed by Dr Fernandez. She states that she has been sending the letter for approximately one month but is now realizing that she may have had the wrong clinic info. She also states that she is sending a therapy letter and is requesting missing office note pages but did not state from which date. Jessica did not say if she was sending the letters via fax or another method.     Jessica [phone]

## 2024-07-08 NOTE — TELEPHONE ENCOUNTER
Returned call to Jessica. Informed her that [facility] did receive the faxes she sent today and the chart notes are being faxed back and that Dr. Fernandez will sign 7/18/24 when she is back in clinic. No further questions or concerns.

## 2024-07-09 ENCOUNTER — TELEPHONE (OUTPATIENT)
Dept: WOUND CARE | Facility: CLINIC | Age: 19
End: 2024-07-09

## 2024-07-09 DIAGNOSIS — L89.313 PRESSURE INJURY OF RIGHT PERINEAL ISCHIAL REGION, STAGE 3 (H): ICD-10-CM

## 2024-07-09 DIAGNOSIS — L89.154 PRESSURE INJURY OF COCCYGEAL REGION, STAGE 4 (H): ICD-10-CM

## 2024-07-09 DIAGNOSIS — L89.214 PRESSURE INJURY OF TROCHANTERIC REGION OF RIGHT HIP, STAGE 4 (H): Primary | ICD-10-CM

## 2024-07-09 NOTE — TELEPHONE ENCOUNTER
Returned call to Torres. Acknowledged the message regarding home care. Torres states the [facility]  is asking for a referral to the [facility] wound clinic to see if that is an option. Will have Dr. Fernandez sign the order when she returns on 7/18/24.

## 2024-07-09 NOTE — TELEPHONE ENCOUNTER
Torres from [facility], will be discharging patient on 7.18 as he has reachedthe end of his contract.  [facility] will be working on getting a new agency, but may not happen by 7.18.  Torres

## 2024-07-18 ENCOUNTER — MEDICAL CORRESPONDENCE (OUTPATIENT)
Dept: HEALTH INFORMATION MANAGEMENT | Facility: CLINIC | Age: 19
End: 2024-07-18

## 2024-08-22 ENCOUNTER — TELEPHONE (OUTPATIENT)
Dept: WOUND CARE | Facility: CLINIC | Age: 19
End: 2024-08-22
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
## 2024-08-22 NOTE — TELEPHONE ENCOUNTER
Patient called and rescheduled. Also informed of clinic policy of missed appointments and notified that he will be receiving a letter. Patient verbalized understanding.     Detail Level: Generalized Quality 130: Documentation Of Current Medications In The Medical Record: Current Medications Documented Quality 431: Preventive Care And Screening: Unhealthy Alcohol Use - Screening: Patient not identified as an unhealthy alcohol user when screened for unhealthy alcohol use using a systematic screening method Quality 226: Preventive Care And Screening: Tobacco Use: Screening And Cessation Intervention: Patient screened for tobacco use and is an ex/non-smoker

## 2024-09-06 NOTE — ADDENDUM NOTE
Encounter addended by: Brenda Paulino PA-C on: 9/6/2024 10:29 AM   Actions taken: Clinical Note Signed

## 2024-09-06 NOTE — ADDENDUM NOTE
Encounter addended by: Eneida Edwards RN on: 9/6/2024 10:43 AM   Actions taken: Clinical Note Signed, Flowsheet accepted

## 2024-09-06 NOTE — PROGRESS NOTES
01/23/24 0855   [facility] 04/28/23 1106 coccyx pressure injury   Placement Date/Time: 04/28/23 1106   Location: coccyx  Type: pressure injury   Thickness/Stage Stage 4   Base slough   Periwound macerated   Periwound Temperature warm   Periwound Skin Turgor soft   Edges open   Length (cm) 5   Width (cm) 2.4   Depth (cm) 2.5   Wound (cm^2) 12 cm^2   Wound Volume (cm^3) 30 cm^3   Wound healing % -16.05   Tunneling [Depth (cm)/Location] 12 oclock/ 7.2 cm and 11 oclock/ 8 cm   Undermining [Depth (cm)/Location] 9-5 oclock/ 4.6 cm   Drainage Characteristics/Odor serosanguineous   Drainage Amount copious   Care, Wound chemical cautery applied;debrided   [facility] 04/28/23 1108 Right greater trochanter pressure injury   Placement Date/Time: 04/28/23 1108   Side: Right  Location: greater trochanter  Type: pressure injury   Thickness/Stage Stage 4   Base granulating;maroon/purple  (DTI)   Periwound intact   Periwound Temperature warm   Periwound Skin Turgor soft   Edges open   Length (cm) 2.5   Width (cm) 3.5   Depth (cm) 0.3   Wound (cm^2) 8.75 cm^2   Wound Volume (cm^3) 2.63 cm^3   Wound healing % 70.48   Drainage Characteristics/Odor tan;serous   Drainage Amount large   Care, Wound chemical cautery applied;debrided

## 2024-09-12 ENCOUNTER — HOSPITAL ENCOUNTER (OUTPATIENT)
Dept: WOUND CARE | Facility: CLINIC | Age: 19
Discharge: HOME | End: 2024-09-12
Attending: SURGERY | Admitting: SURGERY
Payer: COMMERCIAL

## 2024-09-12 DIAGNOSIS — L89.154 PRESSURE INJURY OF COCCYGEAL REGION, STAGE 4 (H): ICD-10-CM

## 2024-09-12 DIAGNOSIS — L89.214 PRESSURE INJURY OF TROCHANTERIC REGION OF RIGHT HIP, STAGE 4 (H): Primary | ICD-10-CM

## 2024-09-12 PROCEDURE — 97602 WOUND(S) CARE NON-SELECTIVE: CPT | Mod: XS | Performed by: SURGERY

## 2024-09-12 PROCEDURE — 99213 OFFICE O/P EST LOW 20 MIN: CPT | Mod: 25 | Performed by: SURGERY

## 2024-09-12 PROCEDURE — 17250 CHEM CAUT OF GRANLTJ TISSUE: CPT | Performed by: SURGERY
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
## 2024-09-12 NOTE — PROGRESS NOTES
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  Labs: No results for input(s): \"ALBUMIN\", \"GLUCOSE\", \"HGB\", \"INR\", \"WBC\", \"A1C\", \"CRP\" in the last 58638 hours.    Invalid input(s): \"PREALBUMIN\", \"MICROBIO\"  Nutrition requirements were discussed with patient today.  Vitals:  There were no vitals taken for this visit.  Wound:   Wound (used by [facility] only) 04/28/23 1106 coccyx pressure injury (Active)   Thickness/Stage Stage 4 09/12/24 1132   Base granulating;slough 09/12/24 1132   Periwound macerated 09/12/24 1132   Periwound Temperature warm 09/12/24 1132   Periwound Skin Turgor soft 09/12/24 1132   Edges open 09/12/24 1132   Length (cm) 5 09/12/24 1132   Width (cm) 3.5 09/12/24 1132   Depth (cm) 2.1 09/12/24 1132   Wound (cm^2) 17.5 cm^2 09/12/24 1132   Wound Volume (cm^3) 36.75 cm^3 09/12/24 1132   Wound healing % -69.25 09/12/24 1132   Tunneling [Depth (cm)/Location] 12 oclock/ 7.2 cm and 11 oclock/ 8 cm 01/23/24 0855   Undermining [Depth (cm)/Location] 8-6 olcok/ 6.5 09/12/24 1132   Drainage Characteristics/Odor serosanguineous 09/12/24 1132   Drainage Amount copious 09/12/24 1132   Care, Wound non-select wound debridement performed. 09/12/24 1132       Wound (used by [facility] only) 04/28/23 1108 Right greater trochanter pressure injury (Active)   Thickness/Stage Stage 4 09/12/24 1132   Base granulating;hypergranulation;slough 09/12/24 1132   Periwound macerated 09/12/24 1132   Periwound Temperature warm 09/12/24 1132   Periwound Skin Turgor soft 09/12/24 1132   Edges open 09/12/24 1132   Length (cm) 1.7 09/12/24 1132   Width (cm) 2.5 09/12/24 1132   Depth (cm) 0.3 09/12/24 1132   Wound (cm^2) 4.25 cm^2 09/12/24 1132   Wound Volume (cm^3) 1.28 cm^3 09/12/24 1132   Wound healing % 85.66 09/12/24 1132 "   Drainage Characteristics/Odor serosanguineous 09/12/24 1132   Drainage Amount large 09/12/24 1132   Care, Wound chemical cautery applied;non-select wound debridement performed. 09/12/24 1132       Wound (used by [facility] only) 02/22/24 0811 Right ischial tuberosity pressure injury (Active)   Thickness/Stage Stage 3 09/12/24 1132   Base closed/resurfaced 09/12/24 1132   Periwound intact 09/12/24 1132   Periwound Temperature warm 09/12/24 1132   Periwound Skin Turgor soft 09/12/24 1132   Edges callused 09/12/24 1132   Length (cm) 0 09/12/24 1132   Width (cm) 0 09/12/24 1132   Depth (cm) 0 09/12/24 1132   Wound (cm^2) 0 cm^2 09/12/24 1132   Wound Volume (cm^3) 0 cm^3 09/12/24 1132   Wound healing % 100 09/12/24 1132   Drainage Characteristics/Odor serosanguineous 05/30/24 1319   Drainage Amount none 09/12/24 1132   Care, Wound non-select wound debridement performed. 05/30/24 1319      Photo:             Further instructions from your care team         09/12/2024   Jamaal West   2005      A DME order for supplies has been placed to [employer]. If there are any issues with your order including not receiving the order please call our clinic at [phone]. Do not call [facility]. We are better able to help you. We can contact the [employer] rep to better assist than if you call the general [place] number. We can provide a tracking number also if needed.     [employer] is now sending an ancillary kit free of charge. This kit includes gauze, gloves, and saline. You will receive 1 kit per 15 days of the supply order. We typically order 30 days of supplies so you will receive 2 kits. Please let us know if you would not like to receive this kit and we can communicate this to [facility].     [facility] not doing dressings  Patient doing own dressing changes currently     PLAN: 09/12/24   -continue to monitor your right ischial area for reoccurrence of injury  -CM looking for Home Care for wound care  -Continue to abstain from  vaping  -Plan 9/12/24: Flap Surgery with Dr Fernandez; plan to recover in Group Home; if needs more than 1/day Antibiotic will need to go to a facility. Will be in hospital 3-5 days. Will need to lie flat in bed for 1 month; changing positions side to side ONLY; head of bed <30 degrees even with meals; will have Home Care nurse to monitor incision and provide wound care; one month appointment can be video if Home care nurse is present. Will begin sitting protocol slowly if incision OK.  -Continue Sathya supplements provided to increase protein intake  Will need: History & Physical 30 days prior to surgery; wait til you get your surgery date before you schedule this.  Done: Temporary Colostomy and straight cath  Patient denies use: No nicotine use past 4 months   Done: Patient is using a Group 2 mattress due to large, stage 4 pressure ulceration on the patient's pelvis that has failed to improve on a normal mattress despite regular wound cares and repositioning. A group 1 mattress is not adequate to offload these severe ulcerations. Patient has impaired sensation and is unable to reposition independently.  Done: wheel chair cushion pressure mapped   August: due new Power Wheelchair with standing capability and tilt and space feature resizing pending 9/12/24  Ongoing compliance with offloading     Expectations for rehab post-flap surgery:  100% bed rest with HOB <30 degrees for at least 4 weeks in a setting that has 24 hour care (TCU, SNF etc). If any issues with wound dehiscence this may be prolonged. It has been increasingly more difficult to find placement in facilities for patients post-flap, if patient has sufficient services in their current living situation rehab at home may be considered. Flap surgery does not guarantee facility placement.  Attend a 4 week post-op appointment at the [facility]. Patient must arrive via stretcher transport to be arranged by patient or facility. This may cost  "$250-$800 out of pocket. Patient may not sit up (in bed or otherwise) until cleared at the post-op appointment. At that point patient will be given a detailed seating protocol which will instruct patient how to gradually begin sitting up in bed. Next, patient's seating system will need to be pressure mapped before starting the wheelchair portion of the sitting protocol.  Continue to be nicotine free at least 8 weeks after surgery.     Wound Dressing Change: Right trochanter  - Wash your hands and prepare surface with soap and water  - Cleanse with mild unscented soap (such as Cetaphil, Cerave or Dove) and water  - IF YOU HAVE VASHE: Apply small amount of VASHE on gauze, to wound bed, for 10 minutes, remove gauze then:  - only need to change the Hydrofera Blue Ready Transfer foam once a day  - AM change only: Cut and apply 1/10th piece of 4x5 Hydrofera Blue Ready Transfer foam   - AM change: cover with 5x5 Zetuvit Plus silicone border  - PM change: cover with 5x9\" ABD and secure with Medipore 2\" tape  Change twice daily and as needed for soilage       Wound Dressing Change: Coccyx  - Wash your hands and prepare surface with soap and water  - Cleanse with mild unscented soap (such as Cetaphil, Cerave or Dove) and water  - IF YOU HAVE VASHE: Apply small amount of VASHE on gauze,to wound bed, for 10 minutes, remove gauze then:  - fluff and tuck 1/2 roll of dry 4\" AMD roll gauze into undermine and wound defect (using total of 1 roll daily)  - AM change: cover with small sacral shape 7x7 Zetuvit Plus silicone border  - PM change: cover with 5x9\" ABD and secure with Medipore 2\" tape  Change twice daily and as needed for soilage     Repositioning:  Bed: Reposition MINIMALLY every 1-2 hours in bed to relieve pressure and promote perfusion to tissue.  Patient is using a Group 2 mattress due to large, stage 4 pressure ulceration on the patient's pelvis that has failed to improve on a normal mattress despite regular wound cares " and repositioning. A group 1 mattress is not adequate to offload these severe ulcerations. Patient has impaired sensation and is unable to reposition independently.  Chair: When up to the chair, do not sit for longer than one hour total before returning to bed for at least 60 minutes to relieve pressure and promote perfusion to the tissue. Completely recline/tilt for 15 minutes each hour.  Sit on a chair cushion when up to the chair.     A diet high in protein is important for wound healing, we recommend getting 90 grams of protein per day. Taking protein shakes or bars are a good way to get extra protein in your diet.   Good sources of protein:  Pork 26g per 3 oz  Whey protein powder - 24g per scoop (on average)  Greek yogurt - 23g per 8oz  Chicken or Turkey - 23g per 3oz  Fish - 20-25g per 3oz  Beef - 18-23g per 3oz  Navy beans - 20g per cup  Cottage cheese - 14g per 1/2 cup  Lentils - 13g per 1/4 cup  Beef jerky 13g per 1oz  2% milk - 8g per cup  Peanut butter - 8g per 2 tablespoons  Eggs - 6g per egg  Mixed nuts - 6g per 2oz        Main Provider: Lauri Fernandez M.D. September 12, 2024

## 2024-09-12 NOTE — DISCHARGE INSTRUCTIONS
09/12/2024   Jamaal [place]   2005      A DME order for supplies has been placed to [employer]. If there are any issues with your order including not receiving the order please call our clinic at [phone]. Do not call [employer]. We are better able to help you. We can contact the [employer] rep to better assist than if you call the general [employer] number. We can provide a tracking number also if needed.     [employer] is now sending an ancillary kit free of charge. This kit includes gauze, gloves, and saline. You will receive 1 kit per 15 days of the supply order. We typically order 30 days of supplies so you will receive 2 kits. Please let us know if you would not like to receive this kit and we can communicate this to [employer].     [facility] not doing dressings  Patient doing own dressing changes currently     PLAN: 09/12/24   -continue to monitor your right ischial area for reoccurrence of injury  -CM looking for Home Care for wound care  -Continue to abstain from vaping  -Plan 9/12/24: Flap Surgery with Dr Fernandez; plan to recover in Group Home; if needs more than 1/day Antibiotic will need to go to a facility. Will be in hospital 3-5 days. Will need to lie flat in bed for 1 month; changing positions side to side ONLY; head of bed <30 degrees even with meals; will have Home Care nurse to monitor incision and provide wound care; one month appointment can be video if Home care nurse is present. Will begin sitting protocol slowly if incision OK.  -Continue Sathya supplements provided to increase protein intake  Will need: History & Physical 30 days prior to surgery; wait til you get your surgery date before you schedule this.  Done: Temporary Colostomy and straight cath  Patient denies use: No nicotine use past 4 months   Done: Patient is using a Group 2 mattress due to large, stage 4 pressure ulceration on the patient's pelvis that has failed to improve on a normal mattress despite regular wound cares and  repositioning. A group 1 mattress is not adequate to offload these severe ulcerations. Patient has impaired sensation and is unable to reposition independently.  Done: wheel chair cushion pressure mapped   August: due new Power Wheelchair with standing capability and tilt and space feature resizing pending 9/12/24  Ongoing compliance with offloading     Expectations for rehab post-flap surgery:  100% bed rest with HOB <30 degrees for at least 4 weeks in a setting that has 24 hour care (TCU, SNF etc). If any issues with wound dehiscence this may be prolonged. It has been increasingly more difficult to find placement in facilities for patients post-flap, if patient has sufficient services in their current living situation rehab at home may be considered. Flap surgery does not guarantee facility placement.  Attend a 4 week post-op appointment at the [facility]. Patient must arrive via stretcher transport to be arranged by patient or facility. This may cost $250-$800 out of pocket. Patient may not sit up (in bed or otherwise) until cleared at the post-op appointment. At that point patient will be given a detailed seating protocol which will instruct patient how to gradually begin sitting up in bed. Next, patient's seating system will need to be pressure mapped before starting the wheelchair portion of the sitting protocol.  Continue to be nicotine free at least 8 weeks after surgery.     Wound Dressing Change: Right trochanter  - Wash your hands and prepare surface with soap and water  - Cleanse with mild unscented soap (such as Cetaphil, Cerave or Dove) and water  - IF YOU HAVE VASHE: Apply small amount of VASHE on gauze, to wound bed, for 10 minutes, remove gauze then:  - only need to change the Hydrofera Blue Ready Transfer foam once a day  - AM change only: Cut and apply 1/10th piece of 4x5 Hydrofera Blue Ready Transfer foam   - AM change: cover with 5x5 Zetuvit Plus silicone border  - PM change:  "cover with 5x9\" ABD and secure with Medipore 2\" tape  Change twice daily and as needed for soilage       Wound Dressing Change: Coccyx  - Wash your hands and prepare surface with soap and water  - Cleanse with mild unscented soap (such as Cetaphil, Cerave or Dove) and water  - IF YOU HAVE VASHE: Apply small amount of VASHE on gauze,to wound bed, for 10 minutes, remove gauze then:  - AM and PM change: fluff and tuck 1/2 roll of dry 4\" AMD roll gauze into undermine and wound defect (using total of 1 roll daily)  - AM change: cover with small sacral shape 7x7 Zetuvit Plus silicone border  - PM change: cover with 5x9\" ABD and secure with Medipore 2\" tape  Change twice daily and as needed for soilage     Repositioning:  Bed: Reposition MINIMALLY every 1-2 hours in bed to relieve pressure and promote perfusion to tissue.  Patient is using a Group 2 mattress due to large, stage 4 pressure ulceration on the patient's pelvis that has failed to improve on a normal mattress despite regular wound cares and repositioning. A group 1 mattress is not adequate to offload these severe ulcerations. Patient has impaired sensation and is unable to reposition independently.  Chair: When up to the chair, do not sit for longer than one hour total before returning to bed for at least 60 minutes to relieve pressure and promote perfusion to the tissue. Completely recline/tilt for 15 minutes each hour.  Sit on a chair cushion when up to the chair.     A diet high in protein is important for wound healing, we recommend getting 90 grams of protein per day. Taking protein shakes or bars are a good way to get extra protein in your diet.   Good sources of protein:  Pork 26g per 3 oz  Whey protein powder - 24g per scoop (on average)  Greek yogurt - 23g per 8oz  Chicken or Turkey - 23g per 3oz  Fish - 20-25g per 3oz  Beef - 18-23g per 3oz  Navy beans - 20g per cup  Cottage cheese - 14g per 1/2 cup  Lentils - 13g per 1/4 cup  Beef jerky 13g per " 1oz  2% milk - 8g per cup  Peanut butter - 8g per 2 tablespoons  Eggs - 6g per egg  Mixed nuts - 6g per 2oz        Main Provider: Lauri Fernandez M.D. September 12, 2024    Call us at [phone] if you have any questions about your wounds, if you have redness or swelling around your wound, have a fever of 101 degrees Fahrenheit or greater or if you have any other problems or concerns. We answer the phone Monday through Friday 8 am to 4 pm, please leave a message as we check the voicemail frequently throughout the day. If you have a concern over the weekend, please leave a message and we will return your call Monday. If the need is urgent, go to the ER or urgent care.    If you had a positive experience please indicate that on your patient satisfaction survey form that [facility] will be sending you.    It was a pleasure meeting with you today.  Thank you for allowing me and my team the privilege of caring for you today.  YOU are the reason we are here, and I truly hope we provided you with the excellent service you deserve.  Please let us know if there is anything else we can do for you so that we can be sure you are leaving completely satisfied with your care experience.      If you have any billing related questions please call the [facility] Business office at [phone]. The clinic staff does not handle billing related matters.    If you are scheduled to have a follow up appointment, you will receive a reminder call the day before your visit. On the appointment day please arrive 15 minutes prior to your appointment time. If you are unable to keep that appointment, please call the clinic to cancel or reschedule. If you are more than 10 minutes late or greater for your scheduled appointment time, the clinic policy is that you may be asked to reschedule.

## 2024-11-14 ENCOUNTER — HOSPITAL ENCOUNTER (OUTPATIENT)
Dept: WOUND CARE | Facility: CLINIC | Age: 19
Discharge: HOME | End: 2024-11-14
Attending: SURGERY | Admitting: SURGERY
Payer: COMMERCIAL

## 2024-11-14 VITALS — HEART RATE: 90 BPM | SYSTOLIC BLOOD PRESSURE: 113 MMHG | DIASTOLIC BLOOD PRESSURE: 60 MMHG | TEMPERATURE: 98.4 F

## 2024-11-14 DIAGNOSIS — L89.214 PRESSURE INJURY OF TROCHANTERIC REGION OF RIGHT HIP, STAGE 4 (H): ICD-10-CM

## 2024-11-14 DIAGNOSIS — L89.154 PRESSURE INJURY OF COCCYGEAL REGION, STAGE 4 (H): Primary | ICD-10-CM

## 2024-11-14 PROCEDURE — 11042 DBRDMT SUBQ TIS 1ST 20SQCM/<: CPT | Performed by: SURGERY

## 2024-11-14 PROCEDURE — G0463 HOSPITAL OUTPT CLINIC VISIT: HCPCS | Mod: 25

## 2024-11-14 NOTE — PROGRESS NOTES
Patient arrived for wound care visit.   Certified Wound Care Nurse time spent evaluating patient record, and completed a full evaluation; provided recommendation based on treatment plan. Reviewed discharge instructions, patient education, and discussed plan of care with appropriate medical team staff members and patient and/or family members.    Last visit with this clinic today. Discussed.

## 2024-11-14 NOTE — DISCHARGE INSTRUCTIONS
[facility]14[facility]2024   [facility]   2005      A DME order for supplies has been placed to [employer]. If there are any issues with your order including not receiving the order please call our clinic at [phone]. Do not call [employer]. We are better able to help you. We can contact the [employer] rep to better assist than if you call the general [employer] number. We can provide a tracking number also if needed.      [employer] is now sending an ancillary kit free of charge. This kit includes gauze, gloves, and saline. You will receive 1 kit per 15 days of the supply order. We typically order 30 days of supplies so you will receive 2 kits. Please let us know if you would not like to receive this kit and we can communicate this to [employer].     [facility] not doing dressings  Patient doing own dressing changes currently     PLAN: 11/14/24   -follow up with [facility] for wound care treatment; this is your last visit here with this clinic as discussed previously.  -continue to monitor your right ischial area for reoccurrence of injury; offloading is key  - looking for Home Care for wound care ongoing  -Continue to abstain from vaping  Done: Temporary Colostomy and straight cath  Done: Patient is using a Group 2 mattress due to large, stage 4 pressure ulceration on the patient's pelvis that has failed to improve on a normal mattress despite regular wound cares and repositioning. A group 1 mattress is not adequate to offload these severe ulcerations. Patient has impaired sensation and is unable to reposition independently.  Done: wheel chair cushion pressure mapped   Done: new Power Wheelchair with standing capability and tilt and space feature   Continue with offloading     Wound Dressing Change: Right trochanter  - Wash your hands and prepare surface with soap and water  - Cleanse with mild unscented soap (such as Cetaphil, Cerave or Dove) and water  - IF YOU HAVE VASHE: Apply small amount of VASHE on gauze, to wound  "bed, for 10 minutes, remove gauze then:  - apply thin layer of zinc moisture barrier (such as Desitin available OTC) to periwound area  - only need to change the Hydrofera Blue Ready Transfer foam once a day  - AM and PM change: fluff and tuck 1/30th roll of dry 4\" AMD gauze into wound  - AM change: cover with 5x5 Zetuvit Plus silicone border  - PM change: cover with one 5x9\" ABD (blue stripe away from wound bed) and secure with Medipore 2\" tape  Change twice daily and as needed for soilage      Wound Dressing Change: Coccyx  - Wash your hands and prepare surface with soap and water  - Cleanse with mild unscented soap (such as Cetaphil, Cerave or Dove) and water  - IF YOU HAVE VASHE: Apply small amount of VASHE on gauze,to wound bed, for 10 minutes, remove gauze then:  - apply thin layer of zinc moisture barrier (such as Desitin available OTC) to periwound area  - AM and PM change: fluff and tuck 1/2 roll of dry 4\" AMD roll gauze into undermine and wound defect (using total of 1 roll daily)  - AM change: cover with small sacral shape 7x7 Zetuvit Plus silicone border  - PM change: cover with one 5x9\" ABD (blue stripe away from wound bed) and secure with Medipore 2\" tape  Change twice daily and as needed for soilage     Repositioning:  Bed: Reposition MINIMALLY every 1-2 hours in bed to relieve pressure and promote perfusion to tissue.  Patient is using a Group 2 mattress due to large, stage 4 pressure ulceration on the patient's pelvis that has failed to improve on a normal mattress despite regular wound cares and repositioning. A group 1 mattress is not adequate to offload these severe ulcerations. Patient has impaired sensation and is unable to reposition independently.  Chair: When up to the chair, do not sit for longer than one hour total before returning to bed for at least 60 minutes or using standing feature in power WC to relieve pressure and promote perfusion to the tissue. Completely recline/tilt for 15 " minutes each hour.  Sit on your mapped chair cushion when up to the chair.     A diet high in protein is important for wound healing, we recommend getting 90 grams of protein per day unless otherwise medically contraindicated. Taking protein shakes or bars are a good way to get extra protein in your diet.   Good sources of protein:  Pork 26g per 3 oz  Whey protein powder - 24g per scoop (on average)  Greek yogurt - 23g per 8oz  Chicken or Turkey - 23g per 3oz  Fish - 20-25g per 3oz  Beef - 18-23g per 3oz  Navy beans - 20g per cup  Cottage cheese - 14g per 1/2 cup  Lentils - 13g per 1/4 cup  Beef jerky 13g per 1oz  2% milk - 8g per cup  Peanut butter - 8g per 2 tablespoons  Eggs - 6g per egg  Mixed nuts - 6g per 2oz     Main Provider: Lauri Fernandez M.D. November 14, 2024    Call us at [phone] if you have any questions about your wounds, if you have redness or swelling around your wound, have a fever of 101 degrees Fahrenheit or greater or if you have any other problems or concerns. We answer the phone Monday through Friday 8 am to 4 pm, please leave a message as we check the voicemail frequently throughout the day. If you have a concern over the weekend, please leave a message and we will return your call Monday. If the need is urgent, go to the ER or urgent care.    If you had a positive experience please indicate that on your patient satisfaction survey form that [facility] will be sending you.    It was a pleasure meeting with you today.  Thank you for allowing me and my team the privilege of caring for you today.  YOU are the reason we are here, and I truly hope we provided you with the excellent service you deserve.  Please let us know if there is anything else we can do for you so that we can be sure you are leaving completely satisfied with your care experience.      If you have any billing related questions please call the [facility] Business office at [phone]. The clinic staff does not  handle billing related matters.    If you are scheduled to have a follow up appointment, you will receive a reminder call the day before your visit. On the appointment day please arrive 15 minutes prior to your appointment time. If you are unable to keep that appointment, please call the clinic to cancel or reschedule. If you are more than 10 minutes late or greater for your scheduled appointment time, the clinic policy is that you may be asked to reschedule.

## 2024-11-14 NOTE — PROGRESS NOTES
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  Labs: No results for input(s): \"ALBUMIN\", \"GLUCOSE\", \"HGB\", \"INR\", \"WBC\", \"A1C\", \"CRP\" in the last 73764 hours.    Invalid input(s): \"PREALBUMIN\", \"MICROBIO\"  Nutrition requirements were discussed with patient today.  Vitals:  /60 (Cuff Size: Adult Regular)   Pulse 90   Temp 98.4  F (36.9  C)   Wound:   Wound (used by OP [facility] only) 04/28/23 1106 coccyx pressure injury (Active)   Thickness/Stage Stage 4 11/14/24 0852   Base granulating;slough 11/14/24 0852   Periwound macerated 11/14/24 0852   Periwound Temperature warm 11/14/24 0852   Periwound Skin Turgor soft 11/14/24 0852   Edges open 11/14/24 0852   Length (cm) 3.9 11/14/24 0852   Width (cm) 2.4 11/14/24 0852   Depth (cm) 2.1 11/14/24 0852   Wound (cm^2) 9.36 cm^2 11/14/24 0852   Wound Volume (cm^3) 19.66 cm^3 11/14/24 0852   Wound healing % 9.48 11/14/24 0852   Tunneling [Depth (cm)/Location] 12 oclock/ 7.2 cm and 11 oclock/ 8 cm 01/23/24 0855   Undermining [Depth (cm)/Location] 9-6 oclock/ 4.7 cm 11/14/24 0852   Drainage Characteristics/Odor serosanguineous 11/14/24 0852   Drainage Amount copious 11/14/24 0852   Care, Wound debrided 11/14/24 0852       Wound (used by OP I only) 04/28/23 1108 Right greater trochanter pressure injury (Active)   Thickness/Stage Stage 4 11/14/24 0852   Base granulating;hypergranulation;slough 11/14/24 0852   Periwound macerated 11/14/24 0852   Periwound Temperature warm 11/14/24 0852   Periwound Skin Turgor soft 11/14/24 0852   Edges open 11/14/24 0852   Length (cm) 1.4 11/14/24 0852   Width (cm) 1.4 11/14/24 0852   Depth (cm) 0.3 11/14/24 0852   Wound (cm^2) 1.96 cm^2 11/14/24 0852   Wound Volume (cm^3) 0.59 cm^3 11/14/24 0852   Wound healing % 93.39 11/14/24 " 0852   Drainage Characteristics/Odor creamy;tan;serous 11/14/24 0852   Drainage Amount large 11/14/24 0852   Care, Wound debrided 11/14/24 0852      Photo:                 Further instructions from your care team         11/14/2024   Jamaal [place]   2005      A DME order for supplies has been placed to [employer]. If there are any issues with your order including not receiving the order please call our clinic at [phone]. Do not call [employer]. We are better able to help you. We can contact the [employer] rep to better assist than if you call the general [place] number. We can provide a tracking number also if needed.      [employer] is now sending an ancillary kit free of charge. This kit includes gauze, gloves, and saline. You will receive 1 kit per 15 days of the supply order. We typically order 30 days of supplies so you will receive 2 kits. Please let us know if you would not like to receive this kit and we can communicate this to [employer].     [facility] not doing dressings  Patient doing own dressing changes currently     PLAN: 11/14/24   -follow up with [facility] for wound care treatment; this is your last visit here with this clinic as discussed previously.  -continue to monitor your right ischial area for reoccurrence of injury; offloading is key  - looking for Home Care for wound care ongoing  -Continue to abstain from vaping  Done: Temporary Colostomy and straight cath  Done: Patient is using a Group 2 mattress due to large, stage 4 pressure ulceration on the patient's pelvis that has failed to improve on a normal mattress despite regular wound cares and repositioning. A group 1 mattress is not adequate to offload these severe ulcerations. Patient has impaired sensation and is unable to reposition independently.  Done: wheel chair cushion pressure mapped   Done: new Power Wheelchair with standing capability and tilt and space feature   Continue with offloading     Wound Dressing Change: Right  "trochanter  - Wash your hands and prepare surface with soap and water  - Cleanse with mild unscented soap (such as Cetaphil, Cerave or Dove) and water  - IF YOU HAVE VASHE: Apply small amount of VASHE on gauze, to wound bed, for 10 minutes, remove gauze then:  - apply thin layer of zinc moisture barrier (such as Desitin available OTC) to periwound area  - only need to change the Hydrofera Blue Ready Transfer foam once a day  - AM and PM change: fluff and tuck 1/30th roll of dry 4\" AMD gauze into wound  - AM change: cover with 5x5 Zetuvit Plus silicone border  - PM change: cover with one 5x9\" ABD (blue stripe away from wound bed) and secure with Medipore 2\" tape  Change twice daily and as needed for soilage      Wound Dressing Change: Coccyx  - Wash your hands and prepare surface with soap and water  - Cleanse with mild unscented soap (such as Cetaphil, Cerave or Dove) and water  - IF YOU HAVE VASHE: Apply small amount of VASHE on gauze,to wound bed, for 10 minutes, remove gauze then:  - apply thin layer of zinc moisture barrier (such as Desitin available OTC) to periwound area  - AM and PM change: fluff and tuck 1/2 roll of dry 4\" AMD roll gauze into undermine and wound defect (using total of 1 roll daily)  - AM change: cover with small sacral shape 7x7 Zetuvit Plus silicone border  - PM change: cover with one 5x9\" ABD (blue stripe away from wound bed) and secure with Medipore 2\" tape  Change twice daily and as needed for soilage     Repositioning:  Bed: Reposition MINIMALLY every 1-2 hours in bed to relieve pressure and promote perfusion to tissue.  Patient is using a Group 2 mattress due to large, stage 4 pressure ulceration on the patient's pelvis that has failed to improve on a normal mattress despite regular wound cares and repositioning. A group 1 mattress is not adequate to offload these severe ulcerations. Patient has impaired sensation and is unable to reposition independently.  Chair: When up to the chair, " do not sit for longer than one hour total before returning to bed for at least 60 minutes or using standing feature in power WC to relieve pressure and promote perfusion to the tissue. Completely recline/tilt for 15 minutes each hour.  Sit on your mapped chair cushion when up to the chair.     A diet high in protein is important for wound healing, we recommend getting 90 grams of protein per day unless otherwise medically contraindicated. Taking protein shakes or bars are a good way to get extra protein in your diet.   Good sources of protein:  Pork 26g per 3 oz  Whey protein powder - 24g per scoop (on average)  Greek yogurt - 23g per 8oz  Chicken or Turkey - 23g per 3oz  Fish - 20-25g per 3oz  Beef - 18-23g per 3oz  Navy beans - 20g per cup  Cottage cheese - 14g per 1/2 cup  Lentils - 13g per 1/4 cup  Beef jerky 13g per 1oz  2% milk - 8g per cup  Peanut butter - 8g per 2 tablespoons  Eggs - 6g per egg  Mixed nuts - 6g per 2oz     Main Provider: Lauri Fernandez M.D. November 14, 2024
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
## 2025-04-09 ENCOUNTER — MEDICAL CORRESPONDENCE (OUTPATIENT)
Dept: HEALTH INFORMATION MANAGEMENT | Facility: CLINIC | Age: 20
End: 2025-04-09
Payer: COMMERCIAL